# Patient Record
Sex: FEMALE | Race: WHITE
[De-identification: names, ages, dates, MRNs, and addresses within clinical notes are randomized per-mention and may not be internally consistent; named-entity substitution may affect disease eponyms.]

---

## 2017-10-25 ENCOUNTER — HOSPITAL ENCOUNTER (OUTPATIENT)
Dept: HOSPITAL 80 - FLAB | Age: 72
End: 2017-10-25
Attending: INTERNAL MEDICINE
Payer: COMMERCIAL

## 2017-10-25 DIAGNOSIS — R91.8: ICD-10-CM

## 2017-10-25 DIAGNOSIS — J44.9: Primary | ICD-10-CM

## 2017-10-25 DIAGNOSIS — I50.9: ICD-10-CM

## 2018-04-13 ENCOUNTER — HOSPITAL ENCOUNTER (INPATIENT)
Dept: HOSPITAL 80 - FED | Age: 73
LOS: 10 days | Discharge: NURSING, REHAB OR LONG-TERM CARE | DRG: 193 | End: 2018-04-23
Attending: INTERNAL MEDICINE | Admitting: INTERNAL MEDICINE
Payer: COMMERCIAL

## 2018-04-13 DIAGNOSIS — J96.21: ICD-10-CM

## 2018-04-13 DIAGNOSIS — Z99.81: ICD-10-CM

## 2018-04-13 DIAGNOSIS — E87.5: ICD-10-CM

## 2018-04-13 DIAGNOSIS — E66.01: ICD-10-CM

## 2018-04-13 DIAGNOSIS — J44.1: ICD-10-CM

## 2018-04-13 DIAGNOSIS — I11.0: ICD-10-CM

## 2018-04-13 DIAGNOSIS — E87.2: ICD-10-CM

## 2018-04-13 DIAGNOSIS — J18.9: Primary | ICD-10-CM

## 2018-04-13 DIAGNOSIS — Z87.891: ICD-10-CM

## 2018-04-13 DIAGNOSIS — N17.9: ICD-10-CM

## 2018-04-13 DIAGNOSIS — I48.91: ICD-10-CM

## 2018-04-13 DIAGNOSIS — E87.1: ICD-10-CM

## 2018-04-13 DIAGNOSIS — I50.31: ICD-10-CM

## 2018-04-13 LAB
INR PPP: 1.2 (ref 0.83–1.16)
PLATELET # BLD: 282 10^3/UL (ref 150–400)
PROTHROMBIN TIME: 15.4 SEC (ref 12–15)

## 2018-04-13 NOTE — EDPHY
HPI/HX/ROS/PE/MDM


Narrative: 


CHIEF COMPLAINT:  Shortness of breath





HISTORY OF PRESENT ILLNESS:


The patient is a 72 y/o female with a history of COPD (on 3-5L room air) 

arriving via EMS complaining of worsening shortness of breath for the past 

three weeks. She saw her PCP after developing a cold, who prescribed her 

Prednisone. At this time she did not have a fever or chest pain, but she was 

coughing up colored sputum. She felt better for 1 week, but then began to feel 

worse again. For the past week she has felt sick and had mild shortness of 

breath. While at dinner tonight she was sitting down and felt more short of 

breath than normal so her nursing home called EMS. When EMS arrived they placed 

her on 6L room air instead of her normal 5L. Takes 324mg PO Aspirin daily and 

uses her inhalers as prescribed. The inhalers have not alleviated her symptoms 

today. Due to her COPD she sleeps in a chair and occasionally has swelling in 

her legs. Denies history of atrial fibrillation, cardiac stents, or MI's. 





No fever, chills, chest pain, palpitations, vomiting, diarrhea, urinary 

complaints, headache, lightheadedness. 





REVIEW OF SYSTEMS:


Aside from elements discussed in the HPI, a comprehensive 10-point review of 

systems was reviewed and is negative.





PAST MEDICAL HISTORY: COPD (on 3-5L room air), hypertension, hyperlipidemia, 

anemia, anxiety, pneumonia, gastritis   





SOCIAL HISTORY: Lives in [place] at [facility], retired,   





VITAL SIGNS: Reviewed by me


GENERAL: Moderately obese, appears tired, well-developed, well-nourished, in 

mild to moderate respiratory distress.


HEENT: Atraumatic. Eyes: No icterus, no injection. Mouth: moist mucous 

membranes.  No erythema or lesions. Neck: supple with no adenopathy.


LUNGS: Tachypneic, diminished breath soudns throughout, no wheezes, rhonchi or 

rales.


CARDIAC: Irregularly irregular heart rate with mild tachycardia, no rubs, 

murmurs or gallops.


ABDOMEN: Soft, nontender, nondistended, bowel sounds normal.


BACK:  No CVA tenderness.


EXTREMITIES: No trauma. Bilateral pitting edema to the knees: 1+ on the left 

and trace on the right. Range of motion is normal throughout.


NEURO: Alert and oriented,  grossly nonfocal.  


SKIN: Warm and dry, no rash.


PSYCHIATRIC: Normal mentation, no agitation.





Portions of this note were transcribed by a medical scribe.  I personally 

performed a history, physical exam, medical decision making, and confirmed 

accuracy of information the transcribed note.





ED Course: 


The patient is a 72 y/o female with a history of COPD (on 3-5L room air) 

arriving via EMS presenting with worsening shortness of breath for the past 

three weeks. On exam she is tachypneic and has diminished breath sounds 

throughout. She also has an irregularly irregular heart rate with mild 

tachycardia, and bilateral pitting edema. She is currently on 6L room air. Labs

, chest x-ray, and EKG ordered. 





2132: 12-LEAD EKG:  Please see the full report in [facility].  My 

interpretation: Atrial fibrillation with a V-rate of 





2234: Chest x-ray reveals a right middle lobe and lower lobe pneumonia as well 

as a small right pleural effusion. Additional labs ordered. She will need to be 

admitted for the pneumonia and new-onset atrial fibrillation.





2236: Consulted with hospitalist service, Dr. English accepts admission of 

this patient. 





2238: Reassessed patient and discussed laboratory and imaging findings. She is 

comfortable with plan for admission. Levofloxacin given for pneumonia.





Severe Sepsis/Septic Shock Care Note





The patient presents to the ED with shortness of breath, new onset atrial 

fibrillation, and pneumonia.  The patient did have evidence of sepsis with 

heart rate greater than 90, and increased O2 requirements.   


Patient did not have evidence of end-organ dysfunction.  


MDM: 





Differential diagnosis for the patient's shortness of breath was considered 

including but not limited to pulmonary infectious processes, COPD exacerbation,

  pulmonary emboli, pulmonary edema, congestive heart failure, and cardiac 

causes.





- Data Points


Imaging Results: 


 Imaging Impressions





Chest X-Ray  04/13/18 21:56


Impression:


1.  Right middle lobe and lower lobe pneumonia.  Small right pleural effusion.


2.  Cardiomegaly.  Possible component of congestive heart failure, with mild 

pulmonary edema.











Imaging: I viewed and interpreted images myself


Laboratory Results: 


 Laboratory Results





 04/13/18 21:00 





 04/13/18 21:00 





 











  04/13/18 04/13/18 04/13/18





  21:00 21:00 21:00


 


WBC      





    


 


RBC      





    


 


Hgb      





    


 


Hct      





    


 


MCV      





    


 


MCH      





    


 


MCHC      





    


 


RDW      





    


 


Plt Count      





    


 


MPV      





    


 


Neut % (Auto)      





    


 


Lymph % (Auto)      





    


 


Mono % (Auto)      





    


 


Eos % (Auto)      





    


 


Baso % (Auto)      





    


 


Nucleat RBC Rel Count      





    


 


Absolute Neuts (auto)      





    


 


Absolute Lymphs (auto)      





    


 


Absolute Monos (auto)      





    


 


Absolute Eos (auto)      





    


 


Absolute Basos (auto)      





    


 


Absolute Nucleated RBC      





    


 


Immature Gran %      





    


 


Immature Gran #      





    


 


PT    Pending   





    


 


INR    Pending   





    


 


APTT    Pending   





    


 


Sodium      131 mEq/L L mEq/L





     (135-145) 


 


Potassium      4.7 mEq/L mEq/L





     (3.5-5.2) 


 


Chloride      94 mEq/L L mEq/L





     () 


 


Carbon Dioxide      26 mEq/l mEq/l





     (22-31) 


 


Anion Gap      11 mEq/L mEq/L





     (8-16) 


 


BUN      20 mg/dL mg/dL





     (7-23) 


 


Creatinine      1.1 mg/dL H mg/dL





     (0.6-1.0) 


 


Estimated GFR      49 





    


 


Glucose      151 mg/dL H mg/dL





     () 


 


Calcium      9.0 mg/dL mg/dL





     (8.5-10.4) 


 


Total Bilirubin  Pending     1.5 mg/dL H mg/dL





     (0.1-1.4) 


 


Conjugated Bilirubin      1.1 mg/dL H mg/dL





     (0.0-0.5) 


 


Unconjugated Bilirubin      0.4 mg/dL mg/dL





     (0.0-1.1) 


 


AST      32 IU/L IU/L





     (14-46) 


 


ALT      46 IU/L IU/L





     (9-52) 


 


Alkaline Phosphatase      79 IU/L IU/L





     () 


 


Troponin I      < 0.012 ng/mL ng/mL





     (0.000-0.034) 


 


NT-Pro-B Natriuret Pep      5660 pg/mL H pg/mL





     (0-125) 


 


Total Protein      6.9 g/dL g/dL





     (6.3-8.2) 


 


Albumin      3.6 g/dL g/dL





     (3.5-5.0) 














  04/13/18





  21:00


 


WBC  12.02 10^3/uL H 10^3/uL





   (3.80-9.50) 


 


RBC  4.00 10^6/uL L 10^6/uL





   (4.18-5.33) 


 


Hgb  11.3 g/dL L g/dL





   (12.6-16.3) 


 


Hct  36.1 % L %





   (38.0-47.0) 


 


MCV  90.3 fL fL





   (81.5-99.8) 


 


MCH  28.3 pg pg





   (27.9-34.1) 


 


MCHC  31.3 g/dL L g/dL





   (32.4-36.7) 


 


RDW  14.6 % %





   (11.5-15.2) 


 


Plt Count  282 10^3/uL 10^3/uL





   (150-400) 


 


MPV  11.8 fL H fL





   (8.7-11.7) 


 


Neut % (Auto)  81.9 % H %





   (39.3-74.2) 


 


Lymph % (Auto)  9.2 % L %





   (15.0-45.0) 


 


Mono % (Auto)  6.8 % %





   (4.5-13.0) 


 


Eos % (Auto)  0.9 % %





   (0.6-7.6) 


 


Baso % (Auto)  0.3 % %





   (0.3-1.7) 


 


Nucleat RBC Rel Count  0.0 % %





   (0.0-0.2) 


 


Absolute Neuts (auto)  9.83 10^3/uL H 10^3/uL





   (1.70-6.50) 


 


Absolute Lymphs (auto)  1.11 10^3/uL 10^3/uL





   (1.00-3.00) 


 


Absolute Monos (auto)  0.82 10^3/uL H 10^3/uL





   (0.30-0.80) 


 


Absolute Eos (auto)  0.11 10^3/uL 10^3/uL





   (0.03-0.40) 


 


Absolute Basos (auto)  0.04 10^3/uL 10^3/uL





   (0.02-0.10) 


 


Absolute Nucleated RBC  0.00 10^3/uL 10^3/uL





   (0-0.01) 


 


Immature Gran %  0.9 % %





   (0.0-1.1) 


 


Immature Gran #  0.11 10^3/uL H 10^3/uL





   (0.00-0.10) 


 


PT  





  


 


INR  





  


 


APTT  





  


 


Sodium  





  


 


Potassium  





  


 


Chloride  





  


 


Carbon Dioxide  





  


 


Anion Gap  





  


 


BUN  





  


 


Creatinine  





  


 


Estimated GFR  





  


 


Glucose  





  


 


Calcium  





  


 


Total Bilirubin  





  


 


Conjugated Bilirubin  





  


 


Unconjugated Bilirubin  





  


 


AST  





  


 


ALT  





  


 


Alkaline Phosphatase  





  


 


Troponin I  





  


 


NT-Pro-B Natriuret Pep  





  


 


Total Protein  





  


 


Albumin  





  











Medications Given: 


 





Levofloxacin/Dextrose (Levaquin 750 Mg (Premix))  150 mls @ 100 mls/hr IV EDNOW 

ONE


   PRN Reason: Protocol


   Stop: 04/14/18 00:09


   Last Admin: 04/13/18 23:16 Dose:  150 mls








General


Time Seen by Provider: 04/13/18 21:39


Initial Vital Signs: 


 Initial Vital Signs











Temperature (C)  36.7 C   04/13/18 21:16


 


Heart Rate  118 H  04/13/18 21:16


 


Respiratory Rate  20   04/13/18 21:16


 


Blood Pressure  167/132 H  04/13/18 21:16


 


O2 Sat (%)  91 L  04/13/18 21:16








 











O2 Delivery Mode               Nasal Cannula


 


O2 (L/minute)                  6














Allergies/Adverse Reactions: 


 





Penicillins Allergy (Verified 04/13/18 21:38)


 








Home Medications: 














 Medication  Instructions  Recorded


 


ACETAMINOPHEN  04/13/18


 


Advair 500/50 (*)  04/13/18


 


Albuterol  04/13/18


 


Aspirin 325 mg (*)  04/13/18


 


Atorvastatin Calcium  04/13/18


 


CLONAZEPAM  04/13/18


 


Escitalopram Oxalate  04/13/18


 


Metoprolol Tartrate  04/13/18


 


Prednisone  04/13/18


 


Proair Hfa  04/13/18


 


Ramipril  04/13/18


 


Spiriva Handihaler  04/13/18


 


Sucralfate  04/13/18


 


Tricor  04/13/18


 


amLODIPine BESYLATE  04/13/18














Departure





- Departure


Disposition: [facility] Acute


Clinical Impression: 


 Shortness of breath, Pleural effusion





Atrial fibrillation


Qualifiers:


 Atrial fibrillation type: unspecified Qualified Code(s): I48.91 - Unspecified 

atrial fibrillation





Pneumonia


Qualifiers:


 Pneumonia type: due to unspecified organism Laterality: right Lung location: 

lower lobe of lung Qualified Code(s): J18.1 - Lobar pneumonia, unspecified 

organism





Condition: Fair


Report Scribed for: Erica Regan


Report Scribed by: Sarah Campuzano


Date of Report: 04/13/18


Time of Report: 21:40

## 2018-04-13 NOTE — CPEKG
Heart Rate: 121

RR Interval: 496

QRSD Interval: 86

QT Interval: 332

QTC Interval: 471

QRS Axis: 58

T Wave Axis: -15

EKG Severity - ABNORMAL ECG -

EKG Impression: ATRIAL FIBRILLATION, V-RATE 

EKG Impression: LOW VOLTAGE IN FRONTAL LEADS

EKG Impression: BORDERLINE T ABNORMALITIES, INFERIOR LEADS

Electronically Signed By: Erica Regan 13-Apr-2018 23:06:47

## 2018-04-14 LAB — PLATELET # BLD: 213 10^3/UL (ref 150–400)

## 2018-04-14 RX ADMIN — IPRATROPIUM BROMIDE AND ALBUTEROL SULFATE SCH ML: .5; 3 SOLUTION RESPIRATORY (INHALATION) at 00:10

## 2018-04-14 RX ADMIN — IPRATROPIUM BROMIDE AND ALBUTEROL SULFATE SCH ML: .5; 3 SOLUTION RESPIRATORY (INHALATION) at 23:52

## 2018-04-14 RX ADMIN — FLUTICASONE PROPIONATE AND SALMETEROL SCH PUFFS: 50; 500 POWDER RESPIRATORY (INHALATION) at 15:21

## 2018-04-14 RX ADMIN — ONDANSETRON PRN MG: 4 TABLET, ORALLY DISINTEGRATING ORAL at 15:56

## 2018-04-14 RX ADMIN — IPRATROPIUM BROMIDE AND ALBUTEROL SULFATE SCH ML: .5; 3 SOLUTION RESPIRATORY (INHALATION) at 18:14

## 2018-04-14 RX ADMIN — IPRATROPIUM BROMIDE AND ALBUTEROL SULFATE SCH ML: .5; 3 SOLUTION RESPIRATORY (INHALATION) at 05:36

## 2018-04-14 RX ADMIN — TIOTROPIUM BROMIDE SCH PUFFS: 18 CAPSULE ORAL; RESPIRATORY (INHALATION) at 15:21

## 2018-04-14 RX ADMIN — ENOXAPARIN SODIUM SCH MG: 100 INJECTION SUBCUTANEOUS at 08:32

## 2018-04-14 RX ADMIN — METOPROLOL TARTRATE SCH MG: 50 TABLET, FILM COATED ORAL at 21:13

## 2018-04-14 RX ADMIN — METOPROLOL TARTRATE SCH MG: 50 TABLET, FILM COATED ORAL at 15:56

## 2018-04-14 RX ADMIN — IPRATROPIUM BROMIDE AND ALBUTEROL SULFATE SCH ML: .5; 3 SOLUTION RESPIRATORY (INHALATION) at 11:58

## 2018-04-14 RX ADMIN — ONDANSETRON PRN MG: 4 TABLET, ORALLY DISINTEGRATING ORAL at 00:42

## 2018-04-14 RX ADMIN — FLUTICASONE PROPIONATE AND SALMETEROL SCH PUFFS: 50; 500 POWDER RESPIRATORY (INHALATION) at 23:52

## 2018-04-14 NOTE — HOSPPROG
Hospitalist Progress Note


Assessment/Plan: 





# Acute Atrial fibrillation w/ RVR- likely triggered by PNA/COPD. [id number] of 

3  


   TElemetry (personally reviewed and interpreted) atrial fibrillation Rates 

currently 140-150 


   - start IV diltiazem drip


   - Monitor on telemetry, TTE ordered


   - Treat underlying pulmonary process


   - continue outpatient metoprolol- can up titrate


   - Consider anticoagulation





# community acquired Pneumonia - with SOB, cough w/ sputum production -CXR- RML/

RLL infiltrates WBC of 12, afebrile.


   - continue levofloxacin IV (multiple prior azithromycin courses) 


   - monitor Blood and sputum cultures 





# Severe COPD - with acute exacerbation - describe still feeling tight this 

morning


   Oxygen saturations 91% on 6 L - received Methylprednisolone x1 


   - continue prednisone 40 mg qD


   - Duonebs QID, albuterol PRN


   - monitor sputum culture 


   - will need outpatient pulmonologist





# Suspected dCHF - Pulmonary edema and elevated BNP on admission- received 

Lasix 20 mg IV x1 in emergency department


   - TTE pending





# Acute on chronic HRF - Likely multifactorial from infectin, COPD exacerbation

, AF, and mild pulmonary edema. 


   Uses 3-5 L/min O2 at baseline, currently on 6 L O2. 


   - Acute treatments as above


   - Incentive spirometry, wean O2 as able





# HTN - Continue metoprolol





# Diet - Regular


Code - Full


Ppx - LMWH


Dispo - Admit under inpatient status noting multiple active issues and need for 

further treatment and work-up.





I have discussed the case with the RN-we will start diltiazem drip this morning 

for rate control





Subjective: Very short of breath


Objective: 


 Vital Signs











Temp Pulse Resp BP Pulse Ox


 


 36.6 C   105 H  20   146/61 H  91 L


 


 04/14/18 11:07  04/14/18 12:01  04/14/18 12:01  04/14/18 11:07  04/14/18 12:01








 Laboratory Results





 04/14/18 03:58 





 04/14/18 03:58 





 











 04/13/18 04/14/18 04/15/18





 05:59 05:59 05:59


 


Intake Total  210 


 


Output Total  50 


 


Balance  160 








 











PT  15.4 SEC (12.0-15.0)  H  04/13/18  21:00    


 


INR  1.20  (0.83-1.16)  H  04/13/18  21:00    














- Physical Exam


Constitutional: no apparent distress


Eyes: anicteric sclera


Ears, Nose, Mouth, Throat: moist mucous membranes


Cardiovascular: irregularly irregular, tachycardia


Respiratory: no respiratory distress


Gastrointestinal: normoactive bowel sounds


Genitourinary: no bladder fullness


Skin: warm


Musculoskeletal: No asymmetric calves


Neurologic: AAOx3


Psychiatric: interacting appropriately


Lymph, Heme, Immunologic: no cervical LAD





ICD10 Worksheet


Patient Problems: 


 Problems











Problem Status Onset


 


Atrial fibrillation Acute  


 


Pleural effusion Acute  


 


Pneumonia Acute  


 


Shortness of breath Acute

## 2018-04-14 NOTE — ASMTCMCOM
CM Note

 

CM Note                       

Notes:

Patient admitted for PNA superimposed on severe COPD.



I met with patient, she lives at [facility]. She does not really have much 

assistance there, just some housekeeping help. She is normally independent. I explained that [facility] 

will likely require an evaulation if she is here more than 24 hours. Patient has a daughter Jasmin who 


is local and supportive. I don't think patient will have any d/c needs, but we will assist if any 

arise. 

 

Date Signed:  04/14/2018 01:33 PM

Electronically Signed By:Disha Blackburn RN

## 2018-04-14 NOTE — ECHO
[email or web address]





[address] [address] 

Main: [phone] 



Fax: 



Transthoracic Echocardiogram 

Name:            CHARLOTTE WOMACK                       MR#:

[id number]

Study Date:      04/14/2018                           Study Time:

07:18 AM

YOB: 1945                           Age:

73 year(s)

Height:          172.7 cm (68 in.)                    Weight:

113.4 kg (250 lb.)

BSA:             2.25 m2                              Gender:

Female

Examination:     Echo                                 Indication:

Question CHF

Image Quality:   Technically Difficult                Contrast: 

Requested by:    Rock Perry                 BP:

123 mmHg/59 mmHg

Heart Rate:                                           Rhythm: 

Indication:      Question CHF 



Procedure Staff 

Ultrasound Technician:   Jen Diaz [facility] 

Reading Physician:       Nilson Caldwell MD 

Requesting Provider: 



Conclusions:          The left atrium is mildly dilated.  

Mild mitral valve leaflet calcification is present.  

Tricuspid valve not well visualized.  

Mild tricuspid regurgitation is present.  

Pulmonary valve not well visualized.  

Pt is chair all night (breathing difficulty). Very limited views

available for interpretation..

Technically very limited study 



Measurements: 

Chambers                   Valvular Assessment AV/MV          Valvular

Assessment TV/PV



Normal                                 Normal

Normal

Name         Value   Range             Name        Value Range

Name          Value Range

Ao Aiax (2D): 3.6 cm  (1.4 cm-2.6 



cm)   



Continued Measurements: 

Chambers  



Name                    Value  

LADs:                 4.8 cm 



Findings: 

Left Atrium: 

The left atrium is mildly dilated.  

Mitral Valve: 

Mild mitral valve leaflet calcification is present.  

Tricuspid Valve: 



Patient: CHARLOTTE WOMACK                     MRN: [id number]

Study Date: 04/14/2018   Page 1 of 2

07:18 AM 









Tricuspid valve not well visualized. Mild tricuspid regurgitation is

present.

Pulmonic Valve: 

Pulmonary valve not well visualized.  

Pericardium: 

There is pericardial fat.  

Exam Comments: 

Pt is chair all night (breathing difficulty). Very limited views

available for interpretation..







Electronically signed by Nilson Caldwell MD on 04/14/2018 at 10:22 AM 

(No Signature Object) 



Patient: CHARLOTTE WOMACK                     MRN: [id number]

Study Date: 04/14/2018   Page 2 of 2

07:18 AM 







[id number]

## 2018-04-14 NOTE — PDMN
Medical Necessity


Medical necessity: C/M review:  est. > 2 MN LO for eval and TX of acute and 

persistent pneumonia, COPD exacerbation, new atrial fibrillation, suspected 

diastolic CHF, acute hypoxic respiratory failure, mild pulmonary edema  

requiring IV Lasix x 1, IV Methylprednisolone x 1, planned echocardiogram, 

ongoing IV Levaquin, Duonebs, cardiac monitoring, supplemental O2 6L/min., 

pulse oximetry, comorbid severe COPD likely GOLD III-D, > 3 exacerbations in 

the last year, chronic hypoxic respiratory failure on baseline 3-5l/min. O2, 

hypertension, obesity, history of patient former smoker per H/P.

## 2018-04-15 LAB — PLATELET # BLD: 214 10^3/UL (ref 150–400)

## 2018-04-15 RX ADMIN — FLUTICASONE PROPIONATE AND SALMETEROL SCH PUFFS: 50; 500 POWDER RESPIRATORY (INHALATION) at 08:48

## 2018-04-15 RX ADMIN — SUCRALFATE PRN GM: 1 TABLET ORAL at 13:57

## 2018-04-15 RX ADMIN — TIOTROPIUM BROMIDE SCH: 18 CAPSULE ORAL; RESPIRATORY (INHALATION) at 10:48

## 2018-04-15 RX ADMIN — FENOFIBRATE SCH MG: 145 TABLET ORAL at 09:29

## 2018-04-15 RX ADMIN — APIXABAN SCH MG: 5 TABLET, FILM COATED ORAL at 13:53

## 2018-04-15 RX ADMIN — IPRATROPIUM BROMIDE AND ALBUTEROL SULFATE SCH ML: .5; 3 SOLUTION RESPIRATORY (INHALATION) at 10:42

## 2018-04-15 RX ADMIN — METOPROLOL TARTRATE SCH MG: 50 TABLET, FILM COATED ORAL at 09:31

## 2018-04-15 RX ADMIN — METOPROLOL TARTRATE SCH MG: 50 TABLET, FILM COATED ORAL at 21:28

## 2018-04-15 RX ADMIN — ONDANSETRON PRN MG: 4 TABLET, ORALLY DISINTEGRATING ORAL at 10:41

## 2018-04-15 RX ADMIN — IPRATROPIUM BROMIDE AND ALBUTEROL SULFATE SCH ML: .5; 3 SOLUTION RESPIRATORY (INHALATION) at 17:07

## 2018-04-15 RX ADMIN — APIXABAN SCH MG: 5 TABLET, FILM COATED ORAL at 21:29

## 2018-04-15 RX ADMIN — ROSUVASTATIN CALCIUM SCH MG: 10 TABLET, FILM COATED ORAL at 09:30

## 2018-04-15 RX ADMIN — ONDANSETRON PRN MG: 4 TABLET, ORALLY DISINTEGRATING ORAL at 04:48

## 2018-04-15 RX ADMIN — SUCRALFATE PRN GM: 1 TABLET ORAL at 04:48

## 2018-04-15 RX ADMIN — DOCUSATE SODIUM AND SENNOSIDES SCH TAB: 50; 8.6 TABLET ORAL at 21:28

## 2018-04-15 RX ADMIN — ONDANSETRON PRN MG: 4 TABLET, ORALLY DISINTEGRATING ORAL at 15:29

## 2018-04-15 RX ADMIN — CETIRIZINE HYDROCHLORIDE SCH MG: 10 TABLET, FILM COATED ORAL at 09:30

## 2018-04-15 RX ADMIN — ENOXAPARIN SODIUM SCH MG: 100 INJECTION SUBCUTANEOUS at 09:30

## 2018-04-15 RX ADMIN — IPRATROPIUM BROMIDE AND ALBUTEROL SULFATE SCH ML: .5; 3 SOLUTION RESPIRATORY (INHALATION) at 06:04

## 2018-04-15 RX ADMIN — PROMETHAZINE HYDROCHLORIDE PRN MG: 25 INJECTION INTRAMUSCULAR; INTRAVENOUS at 19:54

## 2018-04-15 NOTE — HOSPPROG
Hospitalist Progress Note


Assessment/Plan: 





# Acute Atrial fibrillation w/ RVR- likely triggered by PNA/COPD. [id number] of 

3  


   Telemetry (personally reviewed and interpreted) atrial fibrillation Rates 

currently 140-150 


   - stopped IV diltiazem drip


   - starting eliquis


   - increase home Metoprolol to 75mb BID





# community acquired Pneumonia - with SOB, cough w/ sputum production -CXR- RML/

RLL infiltrates WBC of 12, afebrile.


   - continue levofloxacin PO


   - monitor Blood and sputum cultures 





# Severe COPD - with acute exacerbation - breathing more comfortably today


   Oxygen saturations 91% on 6 L - received Methylprednisolone x1 


   - continue prednisone 40 mg qD


   - Duonebs QID, albuterol PRN


   - monitor sputum culture 


   - will need outpatient pulmonologist





# Suspected dCHF - Pulmonary edema and elevated BNP on admission- received 

Lasix 20 mg IV x1 in emergency department


   - TTE pending





# Acute on chronic HRF - Likely multifactorial from infectin, COPD exacerbation

, AF, and mild pulmonary edema. 


   Uses 3-5 L/min O2 at baseline, currently on 6 L O2. 


   - Acute treatments as above


   - Incentive spirometry, wean O2 as able





# HTN - Continue metoprolol





# Diet - Regular


Code - Full


Ppx - LMWH


Dispo - Admit under inpatient status noting multiple active issues and need for 

further treatment and work-up.





I have discussed the case with the RN-we will start diltiazem drip this morning 

for rate control





Subjective: sob


Objective: 


 Vital Signs











Temp Pulse Resp BP Pulse Ox


 


 36.9 C   102 H  20   125/77 H  90 L


 


 04/15/18 12:00  04/15/18 12:00  04/15/18 12:00  04/15/18 12:00  04/15/18 12:00








 Laboratory Results





 04/15/18 03:46 





 04/14/18 03:58 





 











 04/14/18 04/15/18 04/16/18





 05:59 05:59 05:59


 


Intake Total 210 1250 


 


Output Total 50 200 


 


Balance 160 1050 








 











PT  15.4 SEC (12.0-15.0)  H  04/13/18  21:00    


 


INR  1.20  (0.83-1.16)  H  04/13/18  21:00    














- Physical Exam


Constitutional: chronically ill appearing, obese


Eyes: anicteric sclera


Ears, Nose, Mouth, Throat: moist mucous membranes


Cardiovascular: regular rate and rhythym


Respiratory: reduced air movement, No expiratory wheeze


Gastrointestinal: normoactive bowel sounds


Genitourinary: no bladder fullness


Skin: warm


Musculoskeletal: full muscle strength


Neurologic: AAOx3


Psychiatric: interacting appropriately


Lymph, Heme, Immunologic: no cervical LAD





ICD10 Worksheet


Patient Problems: 


 Problems











Problem Status Onset


 


Atrial fibrillation Acute  


 


Pleural effusion Acute  


 


Pneumonia Acute  


 


Shortness of breath Acute  


 


chronic disease mgmt/transitional care Acute

## 2018-04-16 RX ADMIN — TIOTROPIUM BROMIDE SCH MCG: 18 CAPSULE ORAL; RESPIRATORY (INHALATION) at 10:37

## 2018-04-16 RX ADMIN — CETIRIZINE HYDROCHLORIDE SCH MG: 10 TABLET, FILM COATED ORAL at 09:44

## 2018-04-16 RX ADMIN — IPRATROPIUM BROMIDE AND ALBUTEROL SULFATE SCH ML: .5; 3 SOLUTION RESPIRATORY (INHALATION) at 16:16

## 2018-04-16 RX ADMIN — APIXABAN SCH MG: 5 TABLET, FILM COATED ORAL at 09:44

## 2018-04-16 RX ADMIN — ROSUVASTATIN CALCIUM SCH MG: 10 TABLET, FILM COATED ORAL at 09:44

## 2018-04-16 RX ADMIN — PROMETHAZINE HYDROCHLORIDE PRN MG: 25 INJECTION INTRAMUSCULAR; INTRAVENOUS at 17:29

## 2018-04-16 RX ADMIN — DOCUSATE SODIUM AND SENNOSIDES SCH TAB: 50; 8.6 TABLET ORAL at 09:45

## 2018-04-16 RX ADMIN — APIXABAN SCH MG: 5 TABLET, FILM COATED ORAL at 21:54

## 2018-04-16 RX ADMIN — IPRATROPIUM BROMIDE AND ALBUTEROL SULFATE SCH ML: .5; 3 SOLUTION RESPIRATORY (INHALATION) at 00:43

## 2018-04-16 RX ADMIN — IPRATROPIUM BROMIDE AND ALBUTEROL SULFATE SCH ML: .5; 3 SOLUTION RESPIRATORY (INHALATION) at 20:59

## 2018-04-16 RX ADMIN — METOPROLOL TARTRATE SCH MG: 50 TABLET, FILM COATED ORAL at 09:44

## 2018-04-16 RX ADMIN — PANTOPRAZOLE SODIUM SCH MG: 40 INJECTION, POWDER, FOR SOLUTION INTRAVENOUS at 10:01

## 2018-04-16 RX ADMIN — ACETAMINOPHEN PRN MG: 325 TABLET ORAL at 03:53

## 2018-04-16 RX ADMIN — FLUTICASONE PROPIONATE AND SALMETEROL SCH PUFFS: 50; 500 POWDER RESPIRATORY (INHALATION) at 20:59

## 2018-04-16 RX ADMIN — PROMETHAZINE HYDROCHLORIDE PRN MG: 25 INJECTION INTRAMUSCULAR; INTRAVENOUS at 07:45

## 2018-04-16 RX ADMIN — IPRATROPIUM BROMIDE AND ALBUTEROL SULFATE SCH: .5; 3 SOLUTION RESPIRATORY (INHALATION) at 06:19

## 2018-04-16 RX ADMIN — FLUTICASONE PROPIONATE AND SALMETEROL SCH PUFFS: 50; 500 POWDER RESPIRATORY (INHALATION) at 00:42

## 2018-04-16 RX ADMIN — FENOFIBRATE SCH MG: 145 TABLET ORAL at 09:44

## 2018-04-16 RX ADMIN — DOCUSATE SODIUM AND SENNOSIDES SCH TAB: 50; 8.6 TABLET ORAL at 21:54

## 2018-04-16 RX ADMIN — IPRATROPIUM BROMIDE AND ALBUTEROL SULFATE SCH ML: .5; 3 SOLUTION RESPIRATORY (INHALATION) at 10:36

## 2018-04-16 RX ADMIN — FLUTICASONE PROPIONATE AND SALMETEROL SCH PUFFS: 50; 500 POWDER RESPIRATORY (INHALATION) at 10:37

## 2018-04-16 RX ADMIN — METOPROLOL TARTRATE SCH MG: 50 TABLET, FILM COATED ORAL at 21:55

## 2018-04-16 NOTE — ASMTCMCOM
CM Note

 

CM Note                       

Notes:

4/16/2018 Case Management Note



Discussed patient d/c with PT.  PT recommending SNF.  Discussed recommendations with patient.



Discussed with daughter Shari who is in agreement.  Transitional Care RN also recommending SNF 

rehab.  Discussed options with Shari and sent referrals at her request for 

[facility], [facility], [facility], [facility], [facility] and [facility] 

[facility].



Spoke with daughter Yaneli who lives in [place].  Yaneli is arriving tomorrow late morning and 

plans to arrive at [facility] after lunch.  Discussed SNF rehabs with Yaneli who is in agreement.



Case Management d/c poc:  to SNF rehab pending acceptance and authorization.



Case Management to follow.

 

Date Signed:  04/16/2018 03:40 PM

Electronically Signed By:Wendy Correa RN

## 2018-04-16 NOTE — ASMTCMCOM
CM Note

 

CM Note                       

Notes:

4/16/2018 Case Management Note



Met w/pt this morning.  Pt was admitted for PNA and AF.



Pt lives at [facility]. She takes three meals a day in the dining zavala.  She also 

has light housekeeping.  She is independent in her ADL's and does not use any med management 

services.



Pt has 2 daughters involved in her cares. Daughter Shari lives in [place] and can be reached at 

[phone].  Daughter Yaneli lives in [place] and can be reached at [phone].



Case Management d/c poc:  to be determined pending outcome of PT evals over the next few days.  Pt 

may require home care.



Case Management to follow.



 

 

Date Signed:  04/16/2018 12:44 PM

Electronically Signed By:Wendy Correa RN

## 2018-04-17 RX ADMIN — DOCUSATE SODIUM AND SENNOSIDES SCH TAB: 50; 8.6 TABLET ORAL at 21:40

## 2018-04-17 RX ADMIN — TIOTROPIUM BROMIDE SCH MCG: 18 CAPSULE ORAL; RESPIRATORY (INHALATION) at 10:26

## 2018-04-17 RX ADMIN — FLUTICASONE PROPIONATE AND SALMETEROL SCH PUFFS: 50; 500 POWDER RESPIRATORY (INHALATION) at 10:25

## 2018-04-17 RX ADMIN — IPRATROPIUM BROMIDE AND ALBUTEROL SULFATE SCH ML: .5; 3 SOLUTION RESPIRATORY (INHALATION) at 16:25

## 2018-04-17 RX ADMIN — APIXABAN SCH MG: 5 TABLET, FILM COATED ORAL at 21:40

## 2018-04-17 RX ADMIN — PANTOPRAZOLE SODIUM SCH MG: 40 INJECTION, POWDER, FOR SOLUTION INTRAVENOUS at 09:09

## 2018-04-17 RX ADMIN — POLYETHYLENE GLYCOL 3350 SCH GM: 17 POWDER, FOR SOLUTION ORAL at 09:10

## 2018-04-17 RX ADMIN — METOPROLOL TARTRATE SCH MG: 50 TABLET, FILM COATED ORAL at 09:10

## 2018-04-17 RX ADMIN — METOPROLOL TARTRATE SCH MG: 50 TABLET, FILM COATED ORAL at 21:39

## 2018-04-17 RX ADMIN — FENOFIBRATE SCH MG: 145 TABLET ORAL at 09:11

## 2018-04-17 RX ADMIN — IPRATROPIUM BROMIDE AND ALBUTEROL SULFATE SCH ML: .5; 3 SOLUTION RESPIRATORY (INHALATION) at 10:25

## 2018-04-17 RX ADMIN — APIXABAN SCH MG: 5 TABLET, FILM COATED ORAL at 09:11

## 2018-04-17 RX ADMIN — DOCUSATE SODIUM AND SENNOSIDES SCH TAB: 50; 8.6 TABLET ORAL at 09:10

## 2018-04-17 RX ADMIN — PROMETHAZINE HYDROCHLORIDE PRN MG: 25 INJECTION INTRAMUSCULAR; INTRAVENOUS at 18:07

## 2018-04-17 RX ADMIN — FLUTICASONE PROPIONATE AND SALMETEROL SCH PUFFS: 50; 500 POWDER RESPIRATORY (INHALATION) at 21:51

## 2018-04-17 RX ADMIN — IPRATROPIUM BROMIDE AND ALBUTEROL SULFATE SCH ML: .5; 3 SOLUTION RESPIRATORY (INHALATION) at 05:24

## 2018-04-17 RX ADMIN — IPRATROPIUM BROMIDE AND ALBUTEROL SULFATE SCH ML: .5; 3 SOLUTION RESPIRATORY (INHALATION) at 21:51

## 2018-04-17 RX ADMIN — ROSUVASTATIN CALCIUM SCH MG: 10 TABLET, FILM COATED ORAL at 09:10

## 2018-04-17 RX ADMIN — CETIRIZINE HYDROCHLORIDE SCH MG: 10 TABLET, FILM COATED ORAL at 09:11

## 2018-04-17 NOTE — ASMTCMCOM
CM Note

 

CM Note                       

Notes:

4/17/2018 Case Management Note



Met w/pt daughter Yaneli to discuss accepting SNF facilities.  Yaneli and sister Shari to visit tonight 

and tomorrow morning to make final decision.  Faxed updates via [facility].



 After discussion, Yaneli requested Palliative Care Consult.  Notified RN and MD of request.



Case Management d/c poc:  to SNF rehab pending family choice.



Case Management to follow.

 

Date Signed:  04/17/2018 04:52 PM

Electronically Signed By:Wendy Correa RN

## 2018-04-17 NOTE — HOSPPROG
Hospitalist Progress Note


Assessment/Plan: 





# Acute Atrial fibrillation w/ RVR- pt remains in afib - rates with improved 

control likely triggered by PNA/COPD. [id number] of 3  


   Telemetry (personally reviewed and interpreted) atrial fibrillation Rates  80

-90


   - stopped IV diltiazem drip


   - started eliquis


   - continue increased home Metoprolol to 75mg BID





# Hyponatremia - drop overnight to 125 - pt with emily poor PO inpatient suspect 

likely a component of hypovolemia - urine sodium low


   - repeat NS bolus now


   - start salt tabs


   - recheck BMP this pm





# KENNETH on CKD- also suspect related to poor PO intake - near baseline


   - NS as above


   - no nephrotoxins on list


   - recheck this pm





# hyperkalemia - 5.5 - has been fluctuating - responded to IVF yesterday


   - repeat bolus


   - kayexelate x 1


   - recheck BMP this afternoon





# community acquired Pneumonia - with SOB, cough w/ sputum production -CXR- RML/

RLL infiltrates WBC of 12-> 6, afebrile.


   - continue levofloxacin PO


   - monitor Blood and sputum cultures 





# Severe COPD - with acute exacerbation - breathing more comfortably today


   Oxygen saturations 96% on 6 L - received Methylprednisolone x1  in ED- 

respiratory pathogen panel negative


   - continue prednisone 40 mg qD


   - Duonebs QID, albuterol PRN


   - will need outpatient pulmonologist





# Suspected dCHF - elevated BNP on admission- received Lasix 20 mg IV x1 in 

emergency department


   - TTE with poor windows - no clear EF estimate





# Acute on chronic HRF - Likely multifactorial from infection, COPD exacerbation

, AF, and mild pulmonary edema. 


   Uses 3-5 L/min O2 at baseline, currently on 6 L O2. 


   - Acute treatments as above


   - Incentive spirometry, wean O2 as able





# HTN - Continue metoprolol





# Diet - Regular


Code - Full


Ppx - LMWH


Dispo - Admit under inpatient status noting multiple active issues and need for 

further treatment and work-up.





I have discussed the case with the RN-adding Kayexalate for hyperkalemia should 

assist with bowel regimen as well


Subjective: breathing feels better- no BM


Objective: 


 Vital Signs











Temp Pulse Resp BP Pulse Ox


 


 36.7 C   110 H  18   105/70   94 


 


 04/17/18 12:00  04/17/18 12:00  04/17/18 12:00  04/17/18 12:00  04/17/18 12:00








 Laboratory Results





 04/15/18 03:46 





 











 04/16/18 04/17/18 04/18/18





 05:59 05:59 05:59


 


Intake Total 200 2130 500


 


Output Total 500 1250 1150


 


Balance -300 880 -650








 











PT  15.4 SEC (12.0-15.0)  H  04/13/18  21:00    


 


INR  1.20  (0.83-1.16)  H  04/13/18  21:00    














- Physical Exam


Constitutional: chronically ill appearing, obese


Eyes: anicteric sclera


Ears, Nose, Mouth, Throat: moist mucous membranes


Cardiovascular: regular rate and rhythym


Respiratory: reduced air movement, No expiratory wheeze


Gastrointestinal: normoactive bowel sounds


Genitourinary: no bladder fullness


Skin: warm


Musculoskeletal: No asymmetric calves


Neurologic: AAOx3


Psychiatric: interacting appropriately


Lymph, Heme, Immunologic: no cervical LAD





ICD10 Worksheet


Patient Problems: 


 Problems











Problem Status Onset


 


Atrial fibrillation Acute  


 


Pleural effusion Acute  


 


Pneumonia Acute  


 


Shortness of breath Acute  


 


chronic disease mgmt/transitional care Acute

## 2018-04-18 LAB — PLATELET # BLD: 211 10^3/UL (ref 150–400)

## 2018-04-18 RX ADMIN — FUROSEMIDE SCH MG: 10 INJECTION, SOLUTION INTRAMUSCULAR; INTRAVENOUS at 15:44

## 2018-04-18 RX ADMIN — DOCUSATE SODIUM AND SENNOSIDES SCH TAB: 50; 8.6 TABLET ORAL at 09:18

## 2018-04-18 RX ADMIN — PANTOPRAZOLE SODIUM SCH MG: 40 INJECTION, POWDER, FOR SOLUTION INTRAVENOUS at 09:10

## 2018-04-18 RX ADMIN — ROSUVASTATIN CALCIUM SCH MG: 10 TABLET, FILM COATED ORAL at 09:19

## 2018-04-18 RX ADMIN — APIXABAN SCH MG: 5 TABLET, FILM COATED ORAL at 22:07

## 2018-04-18 RX ADMIN — CETIRIZINE HYDROCHLORIDE SCH MG: 10 TABLET, FILM COATED ORAL at 09:19

## 2018-04-18 RX ADMIN — ONDANSETRON PRN MG: 2 SOLUTION INTRAMUSCULAR; INTRAVENOUS at 22:16

## 2018-04-18 RX ADMIN — POLYETHYLENE GLYCOL 3350 SCH GM: 17 POWDER, FOR SOLUTION ORAL at 09:19

## 2018-04-18 RX ADMIN — PROMETHAZINE HYDROCHLORIDE PRN MG: 25 INJECTION INTRAMUSCULAR; INTRAVENOUS at 09:05

## 2018-04-18 RX ADMIN — FENOFIBRATE SCH MG: 145 TABLET ORAL at 09:19

## 2018-04-18 RX ADMIN — IPRATROPIUM BROMIDE AND ALBUTEROL SULFATE SCH ML: .5; 3 SOLUTION RESPIRATORY (INHALATION) at 12:01

## 2018-04-18 RX ADMIN — IPRATROPIUM BROMIDE AND ALBUTEROL SULFATE SCH ML: .5; 3 SOLUTION RESPIRATORY (INHALATION) at 16:25

## 2018-04-18 RX ADMIN — METOPROLOL TARTRATE SCH MG: 50 TABLET, FILM COATED ORAL at 09:19

## 2018-04-18 RX ADMIN — FLUTICASONE PROPIONATE AND SALMETEROL SCH PUFFS: 50; 500 POWDER RESPIRATORY (INHALATION) at 12:01

## 2018-04-18 RX ADMIN — METOPROLOL TARTRATE SCH MG: 50 TABLET, FILM COATED ORAL at 22:08

## 2018-04-18 RX ADMIN — DOCUSATE SODIUM AND SENNOSIDES SCH: 50; 8.6 TABLET ORAL at 22:07

## 2018-04-18 RX ADMIN — TIOTROPIUM BROMIDE SCH MCG: 18 CAPSULE ORAL; RESPIRATORY (INHALATION) at 12:01

## 2018-04-18 RX ADMIN — IPRATROPIUM BROMIDE AND ALBUTEROL SULFATE SCH ML: .5; 3 SOLUTION RESPIRATORY (INHALATION) at 20:38

## 2018-04-18 RX ADMIN — APIXABAN SCH MG: 5 TABLET, FILM COATED ORAL at 09:19

## 2018-04-18 RX ADMIN — IPRATROPIUM BROMIDE AND ALBUTEROL SULFATE SCH ML: .5; 3 SOLUTION RESPIRATORY (INHALATION) at 06:01

## 2018-04-18 RX ADMIN — FLUTICASONE PROPIONATE AND SALMETEROL SCH PUFFS: 50; 500 POWDER RESPIRATORY (INHALATION) at 20:39

## 2018-04-18 NOTE — HOSPPROG
Hospitalist Progress Note


Assessment/Plan: 





DIAGNOSES: 


# Acute on chronic hypoxemic respiratory failure, multifactorial


* Continue to be quite debilitated by her breathing and needing more oxygen 

than her baseline


# Severe COPD - with acute exacerbation 


* Currently prednisone 40 per day


* zyrtec, DuoNeb, Spiriva ongoing


# community acquired Pneumonia 


* Is at the end of the usual duration for antibiotics, but still with quite a 

bit of cough, will repeat x-ray


* No organisms identified so far


# acute on probably chronic right-sided congestive heart failure (

echocardiogram done this admission was a very poor quality, unable to really 

assess heart anatomy and function well)


* Still with quite a bit of edema; received just a single dose of Lasix on 

admission day, no direct since then (? because of renal insufficiency) - at 

this point I think diuresis is going to be essential and in fact may be very 

useful for her renal function if we can decompress her right ventricle filling 

pressures 


* She is not on a low-salt diet, and compression stockings may also help 

somewhat


* Likely has obesity hypoventilation syndrome, and would be highly suspicious 

of sleep apnea; unclear to me if she has been tested for that


# Acute Atrial fibrillation w/ RVR new diagnosis this admission / [id number] of 

3  


* Rate currently controlled on metoprolol


* On Eliquis for stroke prevention


# KENNETH on CKD-


* Unchanged in last several days, suspect this is hemodynamic and there may be 

poor renal perfusion due to right heart failure


# Hyponatremia / hyperkalemia


* Exam and urine sodium suggest this is a hypervolemic hyponatremia and should 

be treated by salt restriction and diuresis; would not use sodium supplements


# gait instability, high fall risk, severe deconditioning and generalized 

weakness


# chronic hypertension on treatment


# moderate normocytic anemia, is currently at her baseline compared to 1 year 

ago; may be related to renal disease


# morbid obesity





Patient seen by me for hospitals rounds, also during multidisciplinary rounds


I reviewed her conditions and plans in detail with the patient, the daughters 

at the bedside, and her nurse





PLANS:


-repeat chest x-ray now, will probably stop antibiotics at this point


-continue steroids bronchodilator therapy


-will add an Acapella flutter valve


-will add low-salt diet and Flaco stockings at this time


-will begin diuresis


-follow weights, volume status, renal function closely


-continue rate control and anticoagulation for AFib; if we have trouble 

controlling her volume overload/heart failure may consider whether a 

cardioversion would be helpful


-continue PT and OT


-will trying get touch with Dr. Roland and see if she has had any testing for 

sleep apnea


-plan on likely transfer to skilled nursing facility, the daughter will go look 

at facilities at this time


-ongoing palliative discussions with the palliative care team at this time;  

apparently the daughters have very different thoughts and ideas about 

appropriate decisions but the patient herself not as clearly decided yet





SUBJECTIVE:


Still very weak and tired though noted it was slightly easier for her to get up 

to the bedside commode today


Still not really able to ambulate much beyond that


Little change in dyspnea from yesterday


Still with cough, unable to bring up phlegm





OBJECTIVE


Vitals reviewed:  Occasional mild tachycardia otherwise stable without fever


Cardiac Monitor, my review:  AFib reasonably well rate controlled





Exam:


alert oriented 


Marked obesity


Unable to determine if JVD present due to obesity


skin warm dry color ok


resps not labored


lungs barely audible BSs without specific abnormal features heard


heart regular


abd soft nondistended nontender, bowel sounds present


limbs warm, still with quite a bit of edema in both legs up beyond the knees





iv site ok





Laboratory data:


Sodium improved today at 128, creatinine unchanged at 124


BUN remains elevated


Slightly more anemic today but at her baseline from year ago








Objective: 


 Vital Signs











Temp Pulse Resp BP Pulse Ox


 


 36.6 C   106 H  20   119/71   96 


 


 04/18/18 11:13  04/18/18 12:01  04/18/18 12:01  04/18/18 11:13  04/18/18 12:01








 Laboratory Results





 04/18/18 03:29 





 04/18/18 03:29 





 











 04/17/18 04/18/18 04/19/18





 06:59 06:59 06:59


 


Intake Total 2130 500 


 


Output Total 1650 2200 300


 


Balance 480 -1700 -300








 











PT  15.4 SEC (12.0-15.0)  H  04/13/18  21:00    


 


INR  1.20  (0.83-1.16)  H  04/13/18  21:00    














ICD10 Worksheet


Patient Problems: 


 Problems











Problem Status Onset


 


Atrial fibrillation Acute  


 


Pleural effusion Acute  


 


Pneumonia Acute  


 


Shortness of breath Acute  


 


chronic disease mgmt/transitional care Acute

## 2018-04-18 NOTE — ASMTCMCOM
CM Note

 

CM Note                       

Notes:

4/18/2018 Case Management Note



Met Caleb and Dilia Groves to chose facility, currently considering [facility] 

[place] and the [facility] in [place].



Pt declined palliative meeting d/t fatigue today.



Case Management d/c poc:  to SNF pending family choice with palliative consult.



Case Management to follow.

 

Date Signed:  04/18/2018 05:00 PM

Electronically Signed By:Wendy Correa RN

## 2018-04-19 LAB — PLATELET # BLD: 239 10^3/UL (ref 150–400)

## 2018-04-19 RX ADMIN — ONDANSETRON PRN MG: 2 SOLUTION INTRAMUSCULAR; INTRAVENOUS at 07:54

## 2018-04-19 RX ADMIN — POLYETHYLENE GLYCOL 3350 SCH GM: 17 POWDER, FOR SOLUTION ORAL at 08:14

## 2018-04-19 RX ADMIN — ACETAMINOPHEN PRN MG: 325 TABLET ORAL at 14:31

## 2018-04-19 RX ADMIN — ACETAMINOPHEN PRN MG: 325 TABLET ORAL at 05:04

## 2018-04-19 RX ADMIN — ACETAMINOPHEN PRN MG: 325 TABLET ORAL at 20:35

## 2018-04-19 RX ADMIN — CETIRIZINE HYDROCHLORIDE SCH MG: 10 TABLET, FILM COATED ORAL at 08:15

## 2018-04-19 RX ADMIN — DOCUSATE SODIUM AND SENNOSIDES SCH TAB: 50; 8.6 TABLET ORAL at 08:15

## 2018-04-19 RX ADMIN — METOPROLOL TARTRATE SCH MG: 50 TABLET, FILM COATED ORAL at 08:15

## 2018-04-19 RX ADMIN — FLUTICASONE PROPIONATE AND SALMETEROL SCH PUFFS: 50; 500 POWDER RESPIRATORY (INHALATION) at 20:32

## 2018-04-19 RX ADMIN — APIXABAN SCH MG: 5 TABLET, FILM COATED ORAL at 10:18

## 2018-04-19 RX ADMIN — ROSUVASTATIN CALCIUM SCH MG: 10 TABLET, FILM COATED ORAL at 08:15

## 2018-04-19 RX ADMIN — FUROSEMIDE SCH MG: 10 INJECTION, SOLUTION INTRAMUSCULAR; INTRAVENOUS at 14:33

## 2018-04-19 RX ADMIN — IPRATROPIUM BROMIDE AND ALBUTEROL SULFATE SCH ML: .5; 3 SOLUTION RESPIRATORY (INHALATION) at 04:56

## 2018-04-19 RX ADMIN — FUROSEMIDE SCH MG: 10 INJECTION, SOLUTION INTRAMUSCULAR; INTRAVENOUS at 10:18

## 2018-04-19 RX ADMIN — FLUTICASONE PROPIONATE AND SALMETEROL SCH PUFFS: 50; 500 POWDER RESPIRATORY (INHALATION) at 09:58

## 2018-04-19 RX ADMIN — IPRATROPIUM BROMIDE AND ALBUTEROL SULFATE SCH ML: .5; 3 SOLUTION RESPIRATORY (INHALATION) at 09:58

## 2018-04-19 RX ADMIN — METOPROLOL TARTRATE SCH MG: 50 TABLET, FILM COATED ORAL at 23:54

## 2018-04-19 RX ADMIN — DOCUSATE SODIUM AND SENNOSIDES SCH: 50; 8.6 TABLET ORAL at 21:07

## 2018-04-19 RX ADMIN — POLYETHYLENE GLYCOL 3350 SCH: 17 POWDER, FOR SOLUTION ORAL at 08:16

## 2018-04-19 RX ADMIN — IPRATROPIUM BROMIDE AND ALBUTEROL SULFATE SCH ML: .5; 3 SOLUTION RESPIRATORY (INHALATION) at 16:53

## 2018-04-19 RX ADMIN — APIXABAN SCH MG: 5 TABLET, FILM COATED ORAL at 21:07

## 2018-04-19 RX ADMIN — FENOFIBRATE SCH MG: 145 TABLET ORAL at 08:15

## 2018-04-19 NOTE — ASMTCMCOM
CM Note

 

CM Note                       

Notes:

Pts case discussed in morning rounds. CM spoke w/ Caio regarding family's choice to use palliative 


after d/c from SNF. CM provided pts daughter Jasmin w/ brochures for [facility]. CM spoke w/ Jasmin 

regarding pts PCP. Jasmin reports that pt has stopped seeing Dr. Jensen and have been seeing an MD at 

[facility]. CM to discuss further w/ family tomorrow regarding palliative and SNF choice. 







Plan: TBD

 

Date Signed:  04/19/2018 02:58 PM

Electronically Signed By:BRITTNEE Lay

## 2018-04-19 NOTE — ASMTCMCOM
CM Note

 

CM Note                       

Notes:

CM spoke w/ daughter and family has chosen [facility]. CM called Mady, at the [facility] and notified 

them of their choice. CM to follow up tomorrow on their selection for outpatient palliative. CM to 

follow.







Plan: [facility]

 

Date Signed:  04/19/2018 04:38 PM

Electronically Signed By:BRITTNEE Lay

## 2018-04-19 NOTE — HOSPPROG
Hospitalist Progress Note


Assessment/Plan: 


Assessment:  73-year-old female presents with acute shortness of breath 

secondary to acute COPD exacerbation, community-acquired pneumonia, acute right-

sided diastolic congestive heart failure exacerbation





Plan:





1. Acute COPD exacerbation.  Ongoing reduced expiratory air movement comma no 

expiratory wheezes or bronchial breath sounds


-status post 6 days of prednisone, discontinue


-continue duo nebs as needed


-will need to be established with outpatient pulmonologist





2. Community-acquired pneumonia.  Present on admission, chest x-ray with 

definitive right lower lobe infiltrate, personally interpreted


-status post 7 days of levofloxacin, discontinue





3. Acute diastolic right-sided congestive heart failure exacerbation.  Remains 

somewhat hypovolemic, lower extremity edema, inspiratory crackles in the bases 

with reduced air movement in the right base


-continue IV Lasix 40 mg twice daily


-continue monitor strict I&Os, daily weights


-net -3 L overnight, net -6 0.5 kg length stay





4. Acute hyponatremia.  Secondary to poor renal perfusion in the setting of 

congestive heart failure exacerbation, continue monitor closely while actively 

diuresing





5. Acute kidney injury on chronic kidney disease stage 3. The creatinine 1.5, 

most likely secondary to renal hypoperfusion in the setting of congestive heart 

failure and poor cardiac output, baseline creatinine 1.1-1.2, currently 1.3, 

continue to monitor closely while actively diuresing





6. Acute on chronic hypoxic respiratory failure.  Evidenced by SpO2 of 88% on 8 

L nasal cannula, up titrated to 11 liters/minute high-flow oxygen, with 

objective tachypnea and shortness of breath with labored breathing, secondary 

to a combination of COPD exacerbation, heart failure, pneumonia


-her baseline oxygen requirements are 3-5 L, clearly requiring more than that 

while hypoxic


-continue supplemental oxygen





7. Atrial fibrillation with acute rapid ventricular response.  New diagnosis, 

suboptimal echocardiogram, most likely provoked in the setting of conditions 

outlined above


-continue metoprolol and Eliquis


-will need outpatient cardiology follow-up





8. Hypertension.  Chronic, continue home medications





9. Morbid obesity.  Increases patient's risk of worsening morbidity and/or 

mortality with BMI of 35.7





Diet.  Cardiac





Prophylaxis.  High risk patient, on Eliquis





Code.  Do full





Disposition.  Anticipated discharge is 4/20 versus 4/21, pending stabilization 

of conditions outlined above





High-level medical complexity, high risk of worsening morbidity and/or 

mortality secondary to the issues outlined above.





Subjective: Patient reports that she is still somewhat weak, but her shortness 

of breath is improving


Objective: 


 Vital Signs











Temp Pulse Resp BP Pulse Ox


 


 36.6 C   98   14   115/85 H  98 


 


 04/19/18 16:49  04/19/18 16:57  04/19/18 16:57  04/19/18 16:49  04/19/18 16:57








 Microbiology











 04/13/18 22:45 Blood Culture - Final





 Blood 


 


 04/13/18 23:12 Blood Culture - Final





 Blood 








 Laboratory Results





 04/19/18 08:46 





 04/19/18 08:46 





 











 04/18/18 04/19/18 04/20/18





 05:59 05:59 05:59


 


Intake Total 500 1850 


 


Output Total 2600 5025 1100


 


Balance -2100 -3175 -1100








 











PT  15.4 SEC (12.0-15.0)  H  04/13/18  21:00    


 


INR  1.20  (0.83-1.16)  H  04/13/18  21:00    














- Pending Discharge


Pending Discharge Within 48 Hours: Yes


Pending Discharge Date: 04/21/18


Pending Discharge Time: 11:00





- Physical Exam


Constitutional: no apparent distress, not in pain, chronically ill appearing, 

obese, No uncomfortable


Cardiovascular: irregularly irregular, edema (1+ bilateral lower extremities), 

No systolic murmur, No tachycardia


Respiratory: reduced air movement (Right base), inspiratory crackles, No 

expiratory wheeze, No bronchial breath sounds, No aegophony


Gastrointestinal: normoactive bowel sounds, soft, non-tender abdomen, no 

palpable masses


Neurologic: AAOx3, sensation intact bilaterally, No weakness


Psychiatric: not anxious, not encephalopathic, flat affect, No agitated





ICD10 Worksheet


Patient Problems: 


 Problems











Problem Status Onset


 


Atrial fibrillation Acute  


 


Pleural effusion Acute  


 


Pneumonia Acute  


 


Shortness of breath Acute  


 


chronic disease mgmt/transitional care Acute

## 2018-04-20 RX ADMIN — ROSUVASTATIN CALCIUM SCH MG: 10 TABLET, FILM COATED ORAL at 09:08

## 2018-04-20 RX ADMIN — APIXABAN SCH MG: 5 TABLET, FILM COATED ORAL at 09:09

## 2018-04-20 RX ADMIN — FLUTICASONE PROPIONATE AND SALMETEROL SCH PUFFS: 50; 500 POWDER RESPIRATORY (INHALATION) at 20:37

## 2018-04-20 RX ADMIN — ONDANSETRON PRN MG: 2 SOLUTION INTRAMUSCULAR; INTRAVENOUS at 13:00

## 2018-04-20 RX ADMIN — POLYETHYLENE GLYCOL 3350 SCH GM: 17 POWDER, FOR SOLUTION ORAL at 09:09

## 2018-04-20 RX ADMIN — ONDANSETRON PRN MG: 2 SOLUTION INTRAMUSCULAR; INTRAVENOUS at 07:50

## 2018-04-20 RX ADMIN — APIXABAN SCH MG: 5 TABLET, FILM COATED ORAL at 20:46

## 2018-04-20 RX ADMIN — FUROSEMIDE SCH MG: 10 INJECTION, SOLUTION INTRAMUSCULAR; INTRAVENOUS at 15:36

## 2018-04-20 RX ADMIN — DOCUSATE SODIUM AND SENNOSIDES SCH: 50; 8.6 TABLET ORAL at 20:48

## 2018-04-20 RX ADMIN — ACETAMINOPHEN PRN MG: 325 TABLET ORAL at 15:37

## 2018-04-20 RX ADMIN — FENOFIBRATE SCH MG: 145 TABLET ORAL at 09:09

## 2018-04-20 RX ADMIN — ACETAMINOPHEN PRN MG: 325 TABLET ORAL at 07:50

## 2018-04-20 RX ADMIN — DOCUSATE SODIUM AND SENNOSIDES SCH TAB: 50; 8.6 TABLET ORAL at 09:08

## 2018-04-20 RX ADMIN — FLUTICASONE PROPIONATE AND SALMETEROL SCH PUFFS: 50; 500 POWDER RESPIRATORY (INHALATION) at 09:58

## 2018-04-20 RX ADMIN — TIOTROPIUM BROMIDE SCH CAP: 18 CAPSULE ORAL; RESPIRATORY (INHALATION) at 09:57

## 2018-04-20 RX ADMIN — FUROSEMIDE SCH MG: 10 INJECTION, SOLUTION INTRAMUSCULAR; INTRAVENOUS at 09:09

## 2018-04-20 RX ADMIN — METOPROLOL TARTRATE SCH MG: 50 TABLET, FILM COATED ORAL at 09:08

## 2018-04-20 RX ADMIN — ACETAMINOPHEN PRN MG: 325 TABLET ORAL at 20:46

## 2018-04-20 RX ADMIN — METOPROLOL TARTRATE SCH MG: 50 TABLET, FILM COATED ORAL at 20:46

## 2018-04-20 RX ADMIN — CETIRIZINE HYDROCHLORIDE SCH MG: 10 TABLET, FILM COATED ORAL at 09:08

## 2018-04-20 NOTE — ASMTCMCOM
CM Note

 

CM Note                       

Notes:

CM spoke w/ Haven and discussed d/c plans. Haven reports that pt is not ready for palliative at 

this time. Haven reports that she plans on speaking w/ Mady at the [facility] and plans on bringing over 


a chair for pt to use while she is there. CM spoke w/ [facility] and discussed an anticipated d/c 

date for Monday. CM sent over updates. CM to follow.







Plan: [facility]

 

Date Signed:  04/20/2018 02:12 PM

Electronically Signed By:BRITTNEE Lay

## 2018-04-21 RX ADMIN — TIOTROPIUM BROMIDE SCH MCG: 18 CAPSULE ORAL; RESPIRATORY (INHALATION) at 08:58

## 2018-04-21 RX ADMIN — DOCUSATE SODIUM AND SENNOSIDES SCH TAB: 50; 8.6 TABLET ORAL at 08:40

## 2018-04-21 RX ADMIN — POLYETHYLENE GLYCOL 3350 SCH GM: 17 POWDER, FOR SOLUTION ORAL at 08:38

## 2018-04-21 RX ADMIN — METOPROLOL TARTRATE SCH MG: 50 TABLET, FILM COATED ORAL at 08:39

## 2018-04-21 RX ADMIN — ACETAMINOPHEN PRN MG: 325 TABLET ORAL at 20:30

## 2018-04-21 RX ADMIN — TIOTROPIUM BROMIDE SCH CAP: 18 CAPSULE ORAL; RESPIRATORY (INHALATION) at 09:00

## 2018-04-21 RX ADMIN — ACETAMINOPHEN PRN MG: 325 TABLET ORAL at 16:04

## 2018-04-21 RX ADMIN — FENOFIBRATE SCH MG: 145 TABLET ORAL at 08:40

## 2018-04-21 RX ADMIN — FLUTICASONE PROPIONATE AND SALMETEROL SCH PUFFS: 50; 500 POWDER RESPIRATORY (INHALATION) at 21:18

## 2018-04-21 RX ADMIN — ONDANSETRON PRN MG: 2 SOLUTION INTRAMUSCULAR; INTRAVENOUS at 12:07

## 2018-04-21 RX ADMIN — ACETAMINOPHEN PRN MG: 325 TABLET ORAL at 06:01

## 2018-04-21 RX ADMIN — DOCUSATE SODIUM AND SENNOSIDES SCH: 50; 8.6 TABLET ORAL at 19:29

## 2018-04-21 RX ADMIN — FLUTICASONE PROPIONATE AND SALMETEROL SCH PUFFS: 50; 500 POWDER RESPIRATORY (INHALATION) at 09:00

## 2018-04-21 RX ADMIN — APIXABAN SCH MG: 5 TABLET, FILM COATED ORAL at 20:33

## 2018-04-21 RX ADMIN — ACETAMINOPHEN PRN MG: 325 TABLET ORAL at 10:59

## 2018-04-21 RX ADMIN — ROSUVASTATIN CALCIUM SCH MG: 10 TABLET, FILM COATED ORAL at 08:39

## 2018-04-21 RX ADMIN — APIXABAN SCH MG: 5 TABLET, FILM COATED ORAL at 08:41

## 2018-04-21 RX ADMIN — CETIRIZINE HYDROCHLORIDE SCH MG: 10 TABLET, FILM COATED ORAL at 08:41

## 2018-04-21 RX ADMIN — METOPROLOL TARTRATE SCH MG: 50 TABLET, FILM COATED ORAL at 20:32

## 2018-04-21 NOTE — ASMTCMCOM
CM Note

 

CM Note                       

Notes:

Discussed in rounds. Patient has discharge plan to go to [facility] in [place] when medically 

cleared for discharge. Likely Monday. CM to follow.



Plan: SNF

 

Date Signed:  04/21/2018 11:37 AM

Electronically Signed By:Nathalia Roldan RN

## 2018-04-21 NOTE — HOSPPROG
Hospitalist Progress Note


Assessment/Plan: 





#Acute COPD exacerbation: at baseline. At baseline oxygen 





#Acutely decompensated RHF/diastolic HR: hold Lasix with KENNETH, alkalosis





#Atrial fibrillation with RVR: BB 75mg BID, CHADs 3, Eliquis





#CAP: completed course abx





#Acute on chronic hypoxic resp failure: BL 3-5. Multifactorial with infection, 

COPD exacerbation. Goal O2 sat 88-90%





#HTN: resume home meds





#Metabolic alkalosis: chronic resp failure, over-diuresis





#KENNETH: Cr to 1.7 today. Holding lasix





#Deconditioning: PT/OT, plan for DC to [facility]





#Diet: 2gm sodium





#DVT: Eliquis





#Disp: cont inpatient admission, monitor Cr, telemetry








Subjective: coughing up brown sputum, feeling less SOB


Objective: 


 Vital Signs











Temp Pulse Resp BP Pulse Ox


 


 37.0 C   88   18   91/63 L  93 


 


 04/21/18 08:49  04/21/18 09:08  04/21/18 09:08  04/21/18 08:49  04/21/18 09:08








 Laboratory Results





 04/19/18 08:46 





 04/21/18 03:27 





 











 04/20/18 04/21/18 04/22/18





 05:59 05:59 05:59


 


Intake Total 400 1425 


 


Output Total 2750 1400 400


 


Balance -2350 25 -400








 











PT  15.4 SEC (12.0-15.0)  H  04/13/18  21:00    


 


INR  1.20  (0.83-1.16)  H  04/13/18  21:00    














- Time Spent With Patient


Time Spent with Patient: greater than 35 minutes


Time Spent with Patient: Greater than 35 minutes spent on this patients care, 

greater than 50% of time spent counseling, educating, and coordinating care 

regarding the above mentioned plan.





- Physical Exam


Constitutional: chronically ill appearing, obese


Eyes: PERRL


Ears, Nose, Mouth, Throat: moist mucous membranes, hearing normal


Cardiovascular: irregularly irregular, edema (+ 3 ankles edema, BL )


Respiratory: no respiratory distress, reduced air movement (decreased BS 

throughout. No wheezing)


Gastrointestinal: normoactive bowel sounds


Genitourinary: no bladder fullness


Skin: warm


Musculoskeletal: full muscle strength


Neurologic: AAOx3, CN II-XII Intact


Psychiatric: interacting appropriately





ICD10 Worksheet


Patient Problems: 


 Problems











Problem Status Onset


 


Atrial fibrillation Acute  


 


Pleural effusion Acute  


 


Pneumonia Acute  


 


Shortness of breath Acute  


 


chronic disease mgmt/transitional care Acute

## 2018-04-22 RX ADMIN — ACETAMINOPHEN PRN MG: 325 TABLET ORAL at 15:07

## 2018-04-22 RX ADMIN — DOCUSATE SODIUM AND SENNOSIDES SCH: 50; 8.6 TABLET ORAL at 21:10

## 2018-04-22 RX ADMIN — DOCUSATE SODIUM AND SENNOSIDES SCH: 50; 8.6 TABLET ORAL at 10:28

## 2018-04-22 RX ADMIN — METOPROLOL TARTRATE SCH MG: 50 TABLET, FILM COATED ORAL at 21:11

## 2018-04-22 RX ADMIN — ACETAMINOPHEN PRN MG: 325 TABLET ORAL at 08:19

## 2018-04-22 RX ADMIN — APIXABAN SCH MG: 5 TABLET, FILM COATED ORAL at 08:19

## 2018-04-22 RX ADMIN — FENOFIBRATE SCH MG: 145 TABLET ORAL at 08:19

## 2018-04-22 RX ADMIN — APIXABAN SCH MG: 5 TABLET, FILM COATED ORAL at 21:11

## 2018-04-22 RX ADMIN — METOPROLOL TARTRATE SCH MG: 50 TABLET, FILM COATED ORAL at 08:18

## 2018-04-22 RX ADMIN — ACETAMINOPHEN PRN MG: 325 TABLET ORAL at 01:13

## 2018-04-22 RX ADMIN — ACETAMINOPHEN PRN MG: 325 TABLET ORAL at 21:13

## 2018-04-22 RX ADMIN — FLUTICASONE PROPIONATE AND SALMETEROL SCH PUFFS: 50; 500 POWDER RESPIRATORY (INHALATION) at 09:35

## 2018-04-22 RX ADMIN — TIOTROPIUM BROMIDE SCH CAP: 18 CAPSULE ORAL; RESPIRATORY (INHALATION) at 09:35

## 2018-04-22 RX ADMIN — FLUTICASONE PROPIONATE AND SALMETEROL SCH PUFFS: 50; 500 POWDER RESPIRATORY (INHALATION) at 20:43

## 2018-04-22 RX ADMIN — CETIRIZINE HYDROCHLORIDE SCH MG: 10 TABLET, FILM COATED ORAL at 08:18

## 2018-04-22 RX ADMIN — ROSUVASTATIN CALCIUM SCH MG: 10 TABLET, FILM COATED ORAL at 08:19

## 2018-04-22 RX ADMIN — POLYETHYLENE GLYCOL 3350 SCH: 17 POWDER, FOR SOLUTION ORAL at 10:28

## 2018-04-22 NOTE — HOSPPROG
Hospitalist Progress Note


Assessment/Plan: 





#Acute COPD exacerbation: at baseline. At baseline oxygen 





#Metabolic alkalosis: chronic resp failure, over-diuresis. Check ABG as may be 

retaining CO2





#Acutely decompensated RHF/diastolic HR: hold Lasix with KENNETH, alkalosis. May 

resume Lasix tomorrow at reduced-dose





#Atrial fibrillation with RVR: resolved.  Metoprolol 75mg BID, CHADs 3, Eliquis





#CAP: completed course abx





#Acute on chronic hypoxic resp failure: BL 3-5. Multifactorial with infection, 

COPD exacerbation. Goal O2 sat 88-90%





#HTN: resume home meds








#KENNETH: Cr to 1.5 today. Holding lasix





#Deconditioning: PT/OT, plan for DC to [facility] rehab





#Diet: 2gm sodium





#DVT: Eliquis





#Disp: cont inpatient admission, monitor Cr, telemetry. May DC in next 1-2 days 

once labs stable








Subjective: fatigued. Worked with PT this morning


Objective: 


 Vital Signs











Temp Pulse Resp BP Pulse Ox


 


 36.8 C   95   18   115/64   94 


 


 04/22/18 07:10  04/22/18 09:38  04/22/18 09:38  04/22/18 07:10  04/22/18 09:38








 Laboratory Results





 04/19/18 08:46 





 04/22/18 03:39 





 











 04/21/18 04/22/18 04/23/18





 05:59 05:59 05:59


 


Intake Total 1425 1430 


 


Output Total 1400 850 500


 


Balance 25 580 -500








 











PT  15.4 SEC (12.0-15.0)  H  04/13/18  21:00    


 


INR  1.20  (0.83-1.16)  H  04/13/18  21:00    














- Time Spent With Patient


Time Spent with Patient: greater than 35 minutes


Time Spent with Patient: Greater than 35 minutes spent on this patients care, 

greater than 50% of time spent counseling, educating, and coordinating care 

regarding the above mentioned plan.





- Physical Exam


Constitutional: obese


Eyes: PERRL


Ears, Nose, Mouth, Throat: moist mucous membranes


Cardiovascular: irregularly irregular, edema (+ 2 pitting edema over shins)


Respiratory: reduced air movement


Skin: warm


Neurologic: AAOx3, CN II-XII Intact





ICD10 Worksheet


Patient Problems: 


 Problems











Problem Status Onset


 


chronic disease mgmt/transitional care Acute  


 


Atrial fibrillation Acute  


 


Shortness of breath Acute  


 


Pneumonia Acute  


 


Pleural effusion Acute

## 2018-04-23 VITALS — DIASTOLIC BLOOD PRESSURE: 59 MMHG | SYSTOLIC BLOOD PRESSURE: 120 MMHG

## 2018-04-23 RX ADMIN — ROSUVASTATIN CALCIUM SCH MG: 10 TABLET, FILM COATED ORAL at 08:57

## 2018-04-23 RX ADMIN — ACETAMINOPHEN PRN MG: 325 TABLET ORAL at 05:06

## 2018-04-23 RX ADMIN — DOCUSATE SODIUM AND SENNOSIDES SCH: 50; 8.6 TABLET ORAL at 09:01

## 2018-04-23 RX ADMIN — TIOTROPIUM BROMIDE SCH CAP: 18 CAPSULE ORAL; RESPIRATORY (INHALATION) at 09:08

## 2018-04-23 RX ADMIN — FLUTICASONE PROPIONATE AND SALMETEROL SCH PUFFS: 50; 500 POWDER RESPIRATORY (INHALATION) at 09:07

## 2018-04-23 RX ADMIN — METOPROLOL TARTRATE SCH MG: 50 TABLET, FILM COATED ORAL at 08:56

## 2018-04-23 RX ADMIN — FENOFIBRATE SCH MG: 145 TABLET ORAL at 08:57

## 2018-04-23 RX ADMIN — ACETAMINOPHEN PRN MG: 325 TABLET ORAL at 10:35

## 2018-04-23 RX ADMIN — APIXABAN SCH MG: 5 TABLET, FILM COATED ORAL at 08:56

## 2018-04-23 RX ADMIN — POLYETHYLENE GLYCOL 3350 SCH: 17 POWDER, FOR SOLUTION ORAL at 09:01

## 2018-04-23 RX ADMIN — CETIRIZINE HYDROCHLORIDE SCH MG: 10 TABLET, FILM COATED ORAL at 08:57

## 2018-04-23 NOTE — ASDISCHSUM
----------------------------------------------

Discharge Information

----------------------------------------------

Plan Status:SNF                                      Medically Cleared to Leave:04/23/2018

Discharge Date:04/23/2018                            CM D/C Disposition:Skilled Nursing Facility

ADT D/C Disposition:Skilled Nursing Facility         Projected Discharge Date:04/18/2018 11:00 AM

Transportation at D/C:Wheelchair Van                 Discharge Delay Reason:

Follow-Up Date:04/18/2018 11:00 AM                   Discharge Slot:

Final Diagnosis:

----------------------------------------------

Placement Information

----------------------------------------------

Referral Type:Assisted Living Residence              Referral ID:[id number]

Provider Name:

Address 1:                                           Phone Number:

Address 2:                                           Fax Number:

City:                                                Critical access hospital Factors:

State:

 

Referral Type:*Nursing Home/SNF                      Referral ID:[id number]

Provider Name:[facility]

Address 1:[address]                            Phone [phone]

Address 2:                                           Fax [phone]

City:[place]                                        Selection Factors:

State:[place]

 

Referral Type:Palliative Care                        Referral ID:[id number]

Provider Name:[facility]

Address 1:[address]                            Phone Number:

Address 2:                                           Fax Number:

[facility]:[place]                                            Selection Factors:

State:[place]

 

----------------------------------------------

Patient Contact Information

----------------------------------------------

Contact Name:CARLITOS                                Relationship:Daughter

Address:                                             Home Phone:

                                                     Work [phone]

City:                                                Alternate Phone:

State/Zip Code:                                      Email:

----------------------------------------------

Financial Information

----------------------------------------------

Financial Class:Medicare

Primary Plan Desc:MEDICARE INPATIENT                 Primary Plan Number:[id number]

Secondary Plan Desc:ROSMERY/LAMAR SUPPLEMENT              Secondary Plan Number:[id number]

 

 

----------------------------------------------

Assessment Information

----------------------------------------------

----------------------------------------------

[facility] CM Progress Note

----------------------------------------------

CM Note

 

CM Note                       

Notes:

Patient admitted for PNA superimposed on severe COPD.



I met with patient, she lives at [facility]. She does not really have much 

assistance there, just some housekeeping help. She is normally independent. I explained that [facility] 

will likely require an evaulation if she is here more than 24 hours. Patient has a daughter Jasmin who 


is local and supportive. I don't think patient will have any d/c needs, but we will assist if any 

arise. 

 

Date Signed:  04/14/2018 01:33 PM

Electronically Signed By:Disha Blackburn RN

 

 

----------------------------------------------

LACE

----------------------------------------------

WYATT

 

Length of stay for            Answers:  7-13 days                             

current admission                                                             

Acuity / Level of             Answers:  Yes                                   

Care: Did the patient                                                         

have an inpatient                                                             

admission?                                                                    

Comorbidities - select        Answers:  Chronic pulmonary disease             

all that apply                                                                

                                        Other                         Notes:  HTN

# of Emergency department     Answers:  1-2                                   

visits in the last 6                                                          

months                                                                        

Social determinants           Answers:  Mental health diagnosis               

                                        (anxiety, depression, pers            

                                        onality disorders, etc.)              

Score: 15

 

Date Signed:  04/23/2018 02:03 PM

Electronically Signed By:Wendy Correa RN

 

 

----------------------------------------------

[facility] CM Progress Note

----------------------------------------------

CM Note

 

CM Note                       

Notes:

4/16/2018 Case Management Note



Met w/pt this morning.  Pt was admitted for PNA and AF.



Pt lives at [facility]. She takes three meals a day in the dining zavala.  She also 

has light housekeeping.  She is independent in her ADL's and does not use any med management 

services.



Pt has 2 daughters involved in her cares. Daughter Shari lives in [place] and can be reached at 

[phone].  Daughter Yaneli lives in [place] and can be reached at [phone].



Case Management d/c poc:  to be determined pending outcome of PT evals over the next few days.  Pt 

may require home care.



Case Management to follow.



 

 

Date Signed:  04/16/2018 12:44 PM

Electronically Signed By:Wendy Correa RN

 

 

----------------------------------------------

[facility] CM Progress Note

----------------------------------------------

CM Note

 

CM Note                       

Notes:

4/16/2018 Case Management Note



Discussed patient d/c with PT.  PT recommending SNF.  Discussed recommendations with patient.



Discussed with daughter Shari who is in agreement.  Transitional Care RN also recommending SNF 

rehab.  Discussed options with Shari and sent referrals at her request for 

[facility], [facility], [facility], [facility], [facility] and [facility].



Spoke with daughter Yaneli who lives in [place].  Yaneli is arriving tomorrow late morning and 

pl[facility] to [facility].  Discussed SNF rehabs with Yaneli who is in agreement.



Case Management d/c poc:  to SNF rehab pending acceptance and authorization.



Case Management to follow.

 

Date Signed:  04/16/2018 03:40 PM

Electronically Signed By:Wendy Correa RN

 

 

----------------------------------------------

[facility] CM Progress Note

----------------------------------------------

CM Note

 

CM Note                       

Notes:

4/17/2018 Case Management Note



Met w/pt daughter Yaneli to discuss accepting SNF facilities.  Yaneli and sister Shari to visit [facility] 

and tomorrow morning to make final decision.  Faxed updates via [employer].



 After discussion, Yaneli requested Palliative Care Consult.  Notified RN and MD of request.



Case Management d/c poc:  to SNF rehab pending family choice.



Case Management to follow.

 

Date Signed:  04/17/2018 04:52 PM

Electronically Signed By:Wendy Correa RN

 

 

----------------------------------------------

[facility] Progress Note

----------------------------------------------

CM Note

 

CM Note                       

Notes:

4/18/2018 Case Management Note



Met w/Yaneli and Dilia Groves to [facility], currently considering [facility] and [facility] and [facility] in [place].



Pt declined palliative meeting d/t fatigue today.



Case Management d/c poc:  to SNF pending family choice with palliative consult.



Case Management to follow.

 

Date Signed:  04/18/2018 05:00 PM

Electronically Signed By:Wendy Correa RN

 

 

----------------------------------------------

[facility] CM Progress Note

----------------------------------------------

CM Note

 

CM Note                       

Notes:

Pts case discussed in morning rounds. JAKE spoke w/ Caio regarding family's choice to use palliative 


after d/c from SNF. JAKE provided pts daughter Jasmin w/ brochures for [facility] and [facility]. JAKE spoke w/ Jasmin 

regarding pts PCP. Jasmin reports that pt has stopped seeing Dr. Jensen and have been seeing an MD at 

[facility]. JAKE to discuss further w/ family tomorrow regarding palliative and SNF choice. 







Plan: TBD

 

Date Signed:  04/19/2018 02:58 PM

Electronically Signed By:BRITTNEE Lay

 

 

----------------------------------------------

[facility] JAKE Progress Note

----------------------------------------------

CM Note

 

CM Note                       

Notes:

JAKE spoke w/ daughter and family has chosen [facility]. CM called Mady, at the [facility] and notified 

them of their choice. CM to follow up tomorrow on their selection for outpatient palliative. CM to 

follow.







Plan: [facility]

 

Date Signed:  04/19/2018 04:38 PM

Electronically Signed By:BRITTNEE Lay

 

 

----------------------------------------------

[facility] CM Progress Note

----------------------------------------------

CM Note

 

CM Note                       

Notes:

CM spoke w/ Haven and discussed d/c plans. Haven reports that pt is not ready for palliative at 

this time. Haven reports that she plans on speaking w/ Mady at the [facility] and plans on bringing over 


a chair for pt to use while she is there. CM spoke w/ [facility] and discussed an anticipated d/c 

date for Monday. CM sent over updates. CM to follow.







Plan: [facility]

 

Date Signed:  04/20/2018 02:12 PM

Electronically Signed By:BRITTNEE Lay

 

 

----------------------------------------------

[facility] JAKE Progress Note

----------------------------------------------

JAKE Note

 

JAKE Note                       

Notes:

Discussed in rounds. Patient has discharge plan to go to [facility] in [place] when medically 

cleared for discharge. Likely Monday. CM to follow.



Plan: [facility]

 

Date Signed:  04/21/2018 11:37 AM

Electronically Signed By:Nathalia Roldan RN

 

 

----------------------------------------------

[facility] CM Progress Note

----------------------------------------------

CM Note

 

CM Note                       

Notes:

4/23/2018 Case Management Note



Discussed case with Caio from palliative care.  Please see palliative care note.  Faxed referral 

to [facility] for [facility] follow up.



Case Management d/c poc:  to [facility].

 

Date Signed:  04/23/2018 10:53 AM

Electronically Signed By:Wendy Correa RN

 

 

----------------------------------------------

Case Management Discharge Plan Note

----------------------------------------------

Case Management Discharge

 

Discharge Order Complete?     Answers:  Yes                                   

Patient to Obtain             Answers:  Other                         Notes:  [facility]

Medications                                                                   

Transportation Arranged       Answers:  Other                         Notes:  arranged by [facility]

Transport will Pick (Date     04/23/2018 04:30 PM

& Time)                       

Faxed Final Orders            Answers:  Yes                                   

Agency/Facility Transfer      Answers:  Yes                           Notes:  faxed to [facility]

Report Printed & Faxed to                                                     

Receiving Agency                                                              

Family Notified               Answers:  Yes                           Notes:  by phone

Discharge Comments            

Notes:

[id number] Case Management Note



Pt to d/c to [facility] with [facility] to follow.  Notified Yaneli at her request of d/c 

time.  [facility] arranged transport.  W/C with [facility].  RN called report.  faxed final orders.

 

Date Signed:  04/23/2018 02:02 PM

Electronically Signed By:Wendy Correa RN

 

 

----------------------------------------------

Intervention Information

----------------------------------------------

## 2018-04-23 NOTE — ASMTCMCOM
CM Note

 

CM Note                       

Notes:

4/23/2018 Case Management Note



Discussed case with Caio from palliative care.  Please see palliative care note.  Faxed referral 

to [facility] for outpatient follow up.



Case Management d/c poc:  to [facility] with [facility].

 

Date Signed:  04/23/2018 10:53 AM

Electronically Signed By:Wendy Correa RN

## 2018-04-23 NOTE — GDS
[f 
rep st]



                                                             DISCHARGE SUMMARY





DISCHARGE DIAGNOSES:  

1.  Acute-on-chronic hypoxemic respiratory failure.

2.  Acute chronic obstructive pulmonary disease exacerbation.

3.  Acutely decompensated right heart failure/diastolic heart failure.

4.  Metabolic alkalosis.

5.  Atrial fibrillation with rapid ventricular response.

6.  Community-acquired pneumonia.

7.  Hypertension.

8.  Acute kidney injury.

9.  Deconditioning.



HISTORY OF PRESENT ILLNESS:  A 73-year-old female with obesity, COPD, chronic 
hypoxemic respiratory failure, and hypertension, presenting with cough and 
shortness of breath.  She noted 5 days prior to admission that she developed a 
new cough and some shortness of breath.  It was productive with yellow/green 
sputum.  She sees Dr. Roland for COPD and uses 3-5 L chronically.  She has had 
at least 3 COPD exacerbations in the past year.



HOSPITAL COURSE BY PROBLEM:  

1.  Acute-on-chronic hypoxemic respiratory failure:  Multifactorial with 
community-acquired pneumonia and COPD exacerbation.  Now stable on her home 
oxygen, 3-5L. 

2.  CAP:  Completed antibiotic course.

3.  Acutely decompensated right heart failure/diastolic heart failure:  Suspect 
diastolic heart failure with longstanding AFib.  She has mild TR on 
echocardiogram. Was diuresed with subsequent acute kidney injury and alkalosis, 
so we held for few days. Resume just 20 mg of p.o. Lasix with repeat BMP in 2 
days. Needs eval for PAT.

4.  Atrial fibrillation:  Went into RVR here.  Increased her metoprolol to 75 
mg from 50 b.i.d.  CHADS score 3.  We will continue Eliquis.

5.  Hypertension:  Resume home medications.

6.  KENNETH:  Creatinine baseline is 1.1 to 1.3.  This was elevated to 1.5 with 
diuresis. Cr now at baseline. Monitor closely with diuresis.

7.  Metabolic alkalosis: has chronic respiratory failure, compounded by 
diuresis. Checked ABG, which showed some retention of CO2, but denies NIPPV. 
Needs outpatient  sleep study to eval for PAT.

8.  Deconditioning:  PT/OT.  She is going to [facility] rehab.



DISPOSITION:  Patient is stable for discharge to [facility] for continuous rehab.



MEDICATIONS:  Change in medications:  Metoprolol 75 mg b.i.d.



FOLLOWUP:  

1.  Dr. Roland.  

2.  Outpatient sleep study for PAT, CPAP.

3.  Repeat BMP on 04/25/2018, to monitor alkalosis and kidney disease.



PHYSICAL EXAMINATION:  VITAL SIGNS:  Today, temperature 37, blood pressure 120/
53, heart rate is 86, respirations 20, 99% on 3 L.  GENERAL:  Obese female, no 
acute distress, talkative.  HEENT:  PERRLA.  Moist mucous membranes.  CV:  
Irregularly irregular.  +1 ankle edema bilaterally.  ABDOMEN:  Obese, soft, 
nontender, nondistended.  Positive bowel sounds.  :  No Dale.  
MUSCULOSKELETAL:  Moving all 4 extremities.  NEURO:  2 through 12 intact.  PSYCH
:  Alert and oriented x3.





Job #:  [id number]

MTDD

## 2018-04-23 NOTE — PDIAF
- Diagnosis


Diagnosis: COPD exacerbation


Code Status: Do Not Resuscitate





- Medication Management


Discharge Medications: 


 Medications to Continue on Transfer





Albuterol Hfa Anes Only [Proair Hfa Icu (*)] 1 - 2 puffs IH QID PRN 04/14/18 [

Last Taken Unknown]


Albuterol [Proventil Neb] 3 ml IH QID PRN 04/14/18 [Last Taken Unknown]


Aspirin [Aspirin 325 mg (*)] 325 mg PO DAILY 04/14/18 [Last Taken 04/13/18]


Cetirizine [ZyrTEC 10 mg (*)] 10 mg PO DAILY 04/14/18 [Last Taken 04/13/18]


Escitalopram Oxalate [Lexapro] 10 mg PO DAILY 04/14/18 [Last Taken 04/13/18]


Famotidine [Pepcid 20 MG (*)] 20 mg PO BID PRN 04/14/18 [Last Taken Unknown]


Fenofibrate [Tricor 145 mg (*)] 145 mg PO DAILY 04/14/18 [Last Taken 04/13/18]


Fluticasone/Salmeter 500/50Mcg [Advair 500/50 (*)] 1 puffs IH BID 04/14/18 [

Last Taken 04/13/18]


Metoprolol Tartrate [Lopressor 50 mg (*)] 50 mg PO BID 04/14/18 [Last Taken 04/ 13/18]


Ramipril [Altace] 10 mg PO DAILY 04/14/18 [Last Taken 04/13/18]


Rosuvastatin Calcium [Crestor 10mg (RX)] 10 mg PO DAILY 04/14/18 [Last Taken 04/ 13/18]


Sucralfate [Carafate 1 GM (*)] 1 gm PO TID PRN 04/14/18 [Last Taken Unknown]


Tiotropium Inhaler [Spiriva Inhaler] 1 inh IH DAILY@12 04/14/18 [Last Taken 04/ 13/18]


amLODIPine BESYLATE [Norvasc 5 mg (*)] 5 mg PO DAILY 04/14/18 [Last Taken 04/13/ 18]


clonazePAM [Klonopin (*)] 0.25 - 0.5 mg PO HS PRN 04/14/18 [Last Taken Unknown]








Discharge Medications: Refer to the Discharge Home Medication list for PRN 

reason.





- Orders


Services needed: Registered Nurse, Certified Nursing Aide, Physical Therapy, 

Occupational Therapy


Isolation Type: None


Diet Recommendation: cardiac -low fat low salt


Additional Instructions: 


Recommend sleep study for PAT.





- Labs/Radiology


BMP Date: 04/25/18 (BL Cr 1.3-1.5. Monitor with Lasix)





- Follow Up Care


Current Providers and Referrals: 


Luis Roland MD [Medical Doctor] - 04/24/18 10:30 am


()


Patient,NotPresent [Unknown] - As per Instructions

## 2018-04-23 NOTE — ASMTLACE
LACE

 

Length of stay for            Answers:  7-13 days                             

current admission                                                             

Acuity / Level of             Answers:  Yes                                   

Care: Did the patient                                                         

have an inpatient                                                             

admission?                                                                    

Comorbidities - select        Answers:  Chronic pulmonary disease             

all that apply                                                                

                                        Other                         Notes:  HTN

# of Emergency department     Answers:  1-2                                   

visits in the last 6                                                          

months                                                                        

Social determinants           Answers:  Mental health diagnosis               

                                        (anxiety, depression, pers            

                                        onality disorders, etc.)              

Score: 15

 

Date Signed:  04/23/2018 02:03 PM

Electronically Signed By:Wendy Correa RN

## 2018-07-31 ENCOUNTER — HOSPITAL ENCOUNTER (INPATIENT)
Dept: HOSPITAL 80 - FED | Age: 73
LOS: 6 days | Discharge: NURSING, REHAB OR LONG-TERM CARE | DRG: 166 | End: 2018-08-06
Attending: FAMILY MEDICINE | Admitting: FAMILY MEDICINE
Payer: COMMERCIAL

## 2018-07-31 DIAGNOSIS — Z99.81: ICD-10-CM

## 2018-07-31 DIAGNOSIS — I10: ICD-10-CM

## 2018-07-31 DIAGNOSIS — I48.91: ICD-10-CM

## 2018-07-31 DIAGNOSIS — J44.9: ICD-10-CM

## 2018-07-31 DIAGNOSIS — C34.11: Primary | ICD-10-CM

## 2018-07-31 DIAGNOSIS — J18.9: ICD-10-CM

## 2018-07-31 DIAGNOSIS — Z79.01: ICD-10-CM

## 2018-07-31 LAB
INR PPP: 1.53 (ref 0.83–1.16)
PLATELET # BLD: 199 10^3/UL (ref 150–400)
PROTHROMBIN TIME: 18.5 SEC (ref 12–15)

## 2018-07-31 NOTE — EDPHY
H & P


Stated Complaint: BIBA for ?nosebleed vs spitting up blood


Source: Patient


Exam Limitations: No limitations





- Personal History


Current Tetanus/Diphtheria Vaccine: Unsure


Current Tetanus Diphtheria and Acellular Pertussis (TDAP): Unsure





- Medical/Surgical History


Hx Asthma: No


Hx Chronic Respiratory Disease: Yes


Hx Diabetes: No


Hx Cardiac Disease: No


Hx Renal Disease: No


Hx Cirrhosis: No


Hx Alcoholism: No


Hx HIV/AIDS: No


Hx Splenectomy or Spleen Trauma: No


Other PMH: anemia, anxiety, obesity, bronchitis, COPD, HTN, hypoxia, limited 

mobility, hyperlipidemia





- Social History


Smoking Status: Former smoker


Time Seen by Provider: 07/31/18 15:29


HPI/ROS: 


HPI:  This is a 73-year-old female who presents with





Chief Complaint: BIBA for ?nosebleed vs spitting up blood





Location:  Mouth


Quality:  Spitting up blood


Duration:  1 tooth 3 hr prior to arrival


Signs and Symptoms:  no shortness of breath at rest, no shortness of breath on 

exertion, no cough, no chest pain, no palpitations, no lower extremity edema, 

no wheezing, no orthopnea, no paroxysmal nocturnal dyspnea, no fever, no injury/

trauma, no hemoptysis, no carpal pedal spasms


Timing:  Acute


Severity:  Mild


Context:  Patient has a history of atrial fibrillation and chronic oxygen use, 

on Eliquis and aspirin, presents via ambulance from skilled nursing facility 

for further investigation a nose bleed versus spitting up blood that started 

approximately 1-3 hours prior to arrival.  Patient reports that she is supposed 

to use saline nasal drops to prevent nasal mucosa dryness due to chronic oxygen 

use but has not been using these.  She reports that she coughed earlier and 

felt like she had phlegm in the back of her throat.  When she spit out she 

noted that there was blood tinged mucus.  She is unsure if she is coughing 

upper fit is coming from her nose.  She denies any chest pain, dizziness, 

shortness of breath.  She reports that her last laboratory studies were 

performed approximately 2 weeks ago.  She denies any easy bruising, bleeding 

gums.  Patient is very talkative and reports that her skilled nursing facility 

sent her to the emergency room even though she"feels fine."


Modifying Factors:  None





Comment: 








ROS: see HPI


Constitutional: No fever, no chills, no weight loss


Eyes:  No blurred vision


Respiratory:  No shortness of breath, no cough


Cardiovascular:  No chest pain, no palpitations, no lower extremity edema 


Gastrointestinal:  No nausea, no vomiting, no diarrhea 


Genitourinary:  No dysuria 


Extremities:  No myalgias 


Neurologic:  No weakness, no numbness


Skin:  No rashes


Hematologic:  No bruising, + bleeding





MEDICAL/SURGICAL/SOCIAL HISTORY: 


Medical/surgical history:  anemia, anxiety, obesity, bronchitis, COPD, HTN, 

hypoxia on supplemental oxygen, limited mobility, hyperlipidemia


Social history:  Lives at skilled nursing facility. 








CONSTITUTIONAL:  Extremely polite and cooperative chronically ill-appearing 

elderly white female, awake and alert, no obvious distress


HEENT: Atraumatic and normocephalic, PERRL, EOMI.  Nares patent; dried anterior 

epistaxis on right near noted, no active bleeding;  no nasal mucosal edema.  

Nasal cannula present in both nares. Tympanic membranes clear. Oropharynx clear

, posterior pharynx shows no blood or edema; no exudate and moist pink mucosa.  

Airway patent.  No lymphadenopathy.  No meningismus.


Cardiovascular: Normal S1/S2, regular rate, regular rhythm, without murmur rub 

or gallop.


PULMONARY/CHEST:  Symmetrical and nontender. Clear to auscultation bilaterally. 

Good air movement. No accessory muscle usage.


ABDOMEN:  Soft, nondistended, nontender, no rebound, no guarding, no peritoneal 

signs, no masses or organomegaly. No CVAT.


EXTREMITIES:  2/2 pulses, strength 5/5, no deformities, no clubbing, no 

cyanosis or edema.


NEUROLOGICAL: no focal neuro deficits.  GCS 15.


SKIN: Warm and dry, no erythema. no rash.  Good capillary refill. 


  


 (WalcottEdyta medina)


Constitutional: 





 Initial Vital Signs











Temperature (C)  36.8 C   07/31/18 14:46


 


Heart Rate  94   07/31/18 14:46


 


Respiratory Rate  16   07/31/18 14:46


 


Blood Pressure  104/76   07/31/18 14:46


 


O2 Sat (%)  95   07/31/18 14:46








 











O2 Delivery Mode               Nasal Cannula


 


O2 (L/minute)                  2














Allergies/Adverse Reactions: 


 





Penicillins Allergy (Verified 07/31/18 14:45)


 Anaphylaxis








Home Medications: 














 Medication  Instructions  Recorded


 


Albuterol Hfa Anes Only [Proair 1 - 2 puffs IH QID PRN 04/14/18





Hfa Icu (*)]  


 


Cetirizine [ZyrTEC 10 mg (*)] 10 mg PO DAILY PRN 04/14/18


 


Escitalopram Oxalate [Lexapro 10 10 mg PO DAILY 04/14/18





MG]  


 


Famotidine [Pepcid 20 MG (*)] 20 mg PO BID PRN 04/14/18


 


Fenofibrate [Tricor 145 mg (*)] 145 mg PO DAILY 04/14/18


 


Fluticasone/Salmeter 500/50Mcg 1 puffs IH BID 04/14/18





[Advair 500/50 (*)]  


 


Sucralfate [Carafate 1 GM (*)] 1 gm PO QID PRN 04/14/18


 


Tiotropium Inhaler [Spiriva 1 inh IH DAILY@12 04/14/18





Inhaler (RX)]  


 


amLODIPine BESYLATE [Norvasc 5 mg 5 mg PO DAILY 04/14/18





(*)]  


 


clonazePAM [Klonopin (*)] 0.25 - 0.5 mg PO HS PRN 04/14/18


 


Acetaminophen [Tylenol  mg 1,000 mg PO Q6 PRN 07/31/18





(*)]  


 


Albuterol Sulfate [Ventolin Hfa] 2 puffs IH Q6H PRN 07/31/18


 


Apixaban [Eliquis] 5 mg PO BID 07/31/18


 


Atorvastatin Calcium [Lipitor 20 20 mg PO HS 07/31/18





mg (*)]  


 


Benzonatate 200 mg PO TID 07/31/18


 


Furosemide [Lasix 20 MG (*)] 20 mg PO DAILY 07/31/18


 


Ipratropium/Albuterol [Duoneb (*)] 3 ml IH QID PRN 07/31/18


 


Ipratropium/Albuterol [Duoneb (*)] 3 ml IH QID PRN 07/31/18


 


Metoprolol Tartrate [Lopressor 50 50 mg PO BID 07/31/18





mg (*)]  


 


guaiFENesin [Mucinex 600 MG (*)] 600 mg PO BID 07/31/18














Medical Decision Making


ED Course/Re-evaluation: 


Vital signs reviewed and stable upon arrival. 


Laboratory studies and chest x-ray ordered. 


Dried anterior area in right near noted; no signs of active bleeding. 


1610:  Called by Radiology, Dr. Fagan, who reports chest x-ray shows right 

consolidation that has been present for over year and slowly increasing in 

size.  He is requesting CT chest with contrast for further evaluation of 

pneumonia.  Patient is on Eliquis and low yield for pulmonary embolism. 


1654:  Labs reviewed.  H&H 11.0/33.5.  INR 1.53. No leukocytosis/

thrombocytopenia. 


1700:  End of shift.  Signed over to Dr. Regan pending creatinine level and 

CT chest to further evaluate right opacification.  Final disposition to be 

determined. 








This patient was seen under the supervision of my secondary supervising 

physician.  I evaluated care for this patient independently.  Discussed this 

patient with Dr. Regan.  


 (Edyta Gallegos)


Differential Diagnosis: 


Differential diagnosis includes but is not limited to hemoptysis, pulmonary 

embolism, atrial fibrillation, chronic anticoagulation, epistaxis, coagulopathy.


 (Edyta Gallegos)


Other Provider: 





I evaluated and participated in the management of the patient.  I also 

evaluated the patient independently.  My co-signature indicates that I have 

reviewed this chart and I agree with the findings and plan of care as 

documented. 





My personal H&P findings include:   


73-year-old patient with history of atrial fibrillation, on Eliquis, 

chronically on oxygen who presents with hemoptysis.  Initially the concern was 

that the patient had had nose bleeding perhaps it just swallowed blood, however

, patient denies any history of a nosebleed and feels that she is coughing up 

blood-tinged mucus.  She denies any chest pain or significant shortness of 

breath.  She is a former smoker.  She has a history of asthma and COPD.





On examination, patient is quite pleasant, alert, and in no respiratory 

distress.  Breath sounds are equal bilaterally.  Heart is regular rate and 

rhythm.





Patient's CT scan demonstrates a large right hilar mass.  It has an appearance 

concerning for malignancy.





I discussed the CT scan with the patient as well as with her daughter.  We will 

admit the patient to the hospital for further evaluation as well as close 

observation of her hemoptysis.





 (Erica Regan)





- Data Points


Laboratory Results: 





 Laboratory Results





 07/31/18 16:20 





 07/31/18 16:20 








Medications Given: 





 





Acetaminophen (Tylenol)  1,000 mg PO Q6 PRN


   PRN Reason: Pain, Moderate


   Stop: 01/27/19 21:44


   Last Admin: 08/03/18 11:14 Dose:  1,000 mg


Albuterol (Proventil Inhaler)  2 puffs IH QID PRN


   PRN Reason: SHORTNESS OF BREATH


   Stop: 01/27/19 21:44


   Last Admin: 08/03/18 05:40 Dose:  2 puffs


Amlodipine Besylate (Norvasc)  5 mg PO DAILY [facility]


   Stop: 01/28/19 08:59


   Last Admin: 08/04/18 08:28 Dose:  Not Given


Apixaban (Eliquis)  5 mg PO BID [facility]


   Stop: 01/30/19 20:59


   Last Admin: 08/04/18 15:37 Dose:  5 mg


Aspirin (Aspirin)  325 mg PO DAILY [facility]


   Stop: 01/28/19 08:59


   Last Admin: 08/02/18 08:07 Dose:  Not Given


Atorvastatin Calcium (Lipitor)  20 mg PO HS [facility]


   Stop: 01/27/19 22:29


   Last Admin: 08/03/18 20:57 Dose:  20 mg


Benzonatate (Tessalon Pearles)  100 mg PO TID PRN


   PRN Reason: Cough, Mild


   Stop: 01/27/19 19:39


   Last Admin: 08/04/18 15:37 Dose:  100 mg


Clonazepam (Klonopin)  0.25 - 0.5 mg PO HS PRN


   PRN Reason: Sleep/Insomnia


   Stop: 01/27/19 21:44


   Last Admin: 08/02/18 18:06 Dose:  0.25 mg


Escitalopram Oxalate (Lexapro)  10 mg PO DAILY [facility]


   Stop: 01/28/19 08:59


   Last Admin: 08/04/18 08:27 Dose:  10 mg


Fenofibrate (Tricor)  145 mg PO DAILY [facility]


   Stop: 01/28/19 08:59


   Last Admin: 08/04/18 08:27 Dose:  145 mg


Furosemide (Lasix)  20 mg PO DAILY [facility]


   Stop: 01/28/19 08:59


   Last Admin: 08/04/18 08:27 Dose:  20 mg


Guaifenesin (Mucinex)  600 mg PO BID [facility]


   Stop: 01/28/19 08:59


   Last Admin: 08/04/18 08:27 Dose:  600 mg


Metoprolol Tartrate (Lopressor)  50 mg PO BID [facility]


   Stop: 01/28/19 08:59


   Last Admin: 08/04/18 08:28 Dose:  Not Given


Fluticasone/Salmeterol (Advair)  1 puffs IH BID [facility]


   Stop: 01/28/19 08:59


   Last Admin: 08/04/18 08:37 Dose:  1 puffs


Sucralfate (Carafate)  1 gm PO QID PRN


   PRN Reason: UPSET STOMACH


   Stop: 01/27/19 21:44


   Last Admin: 08/02/18 16:44 Dose:  1 gm


Tiotropium Bromide (Spiriva Handihaler)  18 mcg IH DAILY@12 [facility]


   Stop: 01/28/19 11:59


   Last Admin: 08/04/18 11:55 Dose:  1 inh





Discontinued Medications





Epinephrine HCl (Epinephrine)  2 mg SUBMUCOSAL .STK-MED ONE


   Stop: 08/02/18 14:30


   Last Admin: 08/02/18 14:29 Dose:  2 mg


Fentanyl (Sublimaze)  100 mcg IVP .STK-MED ONE


   Stop: 08/02/18 14:31


   Last Admin: 08/02/18 14:30 Dose:  100 mcg


Guaifenesin (Mucinex)  1,200 mg PO BID [facility]


   Stop: 01/27/19 20:59


   Last Admin: 07/31/18 21:52 Dose:  1,200 mg


Potassium Chloride/Sodium Chloride (Ns W/ 20 Kcl/L)  1,000 mls @ 75 mls/hr IV 

CONT [facility]


   Stop: 01/28/19 10:44


   Last Admin: 08/02/18 00:20 Dose:  1,000 mls


Sodium Chloride (Ns)  500 mls @ 25 mls/hr IV ONCALL ONE


   Stop: 08/03/18 08:57


   Last Admin: 08/02/18 15:32 Dose:  Not Given


Sodium Chloride (Ns)  500 mls @ 1,500 mls/hr IV ONCE ONE


   Stop: 08/04/18 11:26


   Last Admin: 08/04/18 11:55 Dose:  500 mls


Levofloxacin (Levaquin)  750 mg PO DAILY AT 10AM [facility]


   PRN Reason: Protocol


   Stop: 08/31/18 09:59


   Last Admin: 08/02/18 10:16 Dose:  750 mg


Midazolam HCl (Versed)  3 mg IVP .STK-MED ONE


   Stop: 08/02/18 14:31


   Last Admin: 08/02/18 14:30 Dose:  3 mg








Departure





- Departure


Disposition: [facility]


Clinical Impression: 


 History of atrial fibrillation, Chronic anticoagulation, Dependence on 

supplemental oxygen, right hilar mass, Hemoptysis





Condition: Fair

## 2018-07-31 NOTE — GHP
[f rep st]



                                                            HISTORY AND PHYSICAL





DATE OF ADMISSION:  07/31/2018



CHIEF COMPLAINT:  Coughing up blood.



HISTORY OF PRESENT ILLNESS:  This is a 73-year-old female with history of COPD, congestive heart fail
ure, AFib, hypertension, who presented to the hospital with hemoptysis.  The patient has had 4 COPD e
xacerbations in the last year.  She was hospitalized in April of this year with right middle/right lo
wer lobe pneumonia. 



The patient was started on levofloxacin last week for presumed upper respiratory infection/COPD exace
rbation.  Her respiratory status has been stable.  She denies any fevers or chills.  She denies any c
hest pain.  Today, she coughed up some blood and came to the emergency department for further evaluat
ion.



PAST MEDICAL HISTORY:  COPD, hypertension, atrial fibrillation, acute on chronic hypoxemic respirator
y failure, right-sided heart failure, hospitalizations for community-acquired pneumonia, acute kidney
 injury.



HOME MEDICATIONS:  Reviewed.  Refer to [facility] for details.



ALLERGIES:  Penicillin.



SOCIAL HISTORY:  The patient is a former smoker.  She denies any alcohol or illicit drug use.



FAMILY HISTORY:  Reviewed and noncontributory.



REVIEW OF SYSTEMS:  Comprehensive 10-point review of systems was done and is negative, except as ment
ioned in the HPI.



PHYSICAL EXAM:  VITAL SIGNS:  Blood pressure 111/67, pulse of 94, respiratory rate 16, O2 sat 95% on 
2 L, temperature afebrile.  GENERAL:  No acute distress.  HEAD:  Normocephalic, atraumatic.  EYES:  P
ERRLA.  Sclerae anicteric.  MOUTH:  Moist mucous membranes.  NECK:  Supple.  No lymphadenopathy.  CAR
DIOVASCULAR:  S1, S2 no JVD.  No lower extremity edema.  PULMONARY:  Diminished breath sounds in the 
right base.  No respiratory distress.  ABDOMEN:  Soft, nontender, nondistended.  No guarding or rebou
nd tenderness.  Normoactive bowel sounds.  EXTREMITIES:  No clubbing or cyanosis.  NEURO:  Cranial ne
rves 2-12 grossly intact.  No focal motor or sensory deficits.  SKIN:  Clear, no rashes.



DIAGNOSTICS:  WBC 6.79, hemoglobin 11, hematocrit 33.5, platelets 199.  Sodium 133, potassium 4.8, ch
loride 96, BUN 43, creatinine 1.4, glucose 120.  Chest CT done today, which I visualized and personal
ly interpreted, shows infiltrative right hilar mass suspicious for primary bronchogenic carcinoma wit
h near complete collapse of the right middle lobe and minimal postobstructive consolidation in the ri
ght upper lobe.  Please refer to report for full details.



ASSESSMENT AND PLAN:  This is a 73-year-old female with history of chronic obstructive pulmonary dise
ase and chronic respiratory failure presenting with: 

1.  Hemoptysis, most likely due to newly diagnosed infiltrative right hilar mass suspicious for prima
ry bronchogenic carcinoma.  Plan:  I discussed case with Dr. Reyes Roland from pulmonology who will see 
the patient in the morning.  For now, will hold dose of Eliquis pending bronchoscopy.

2.  Suspected postobstructive pneumonia.  Plan:  We will continue current dose of levofloxacin.

3.  History of atrial fibrillation.  Plan:  Once again, Eliquis will be held.  We will continue home 
rate-controlling medications. 

The patient requests to be full code status.





Job #:  [id number]

## 2018-08-01 LAB — PLATELET # BLD: 175 10^3/UL (ref 150–400)

## 2018-08-01 RX ADMIN — METOPROLOL TARTRATE SCH MG: 50 TABLET, FILM COATED ORAL at 09:56

## 2018-08-01 RX ADMIN — ASPIRIN SCH: 325 TABLET, FILM COATED ORAL at 09:57

## 2018-08-01 RX ADMIN — ATORVASTATIN CALCIUM SCH MG: 20 TABLET, FILM COATED ORAL at 00:08

## 2018-08-01 RX ADMIN — FUROSEMIDE SCH MG: 20 TABLET ORAL at 09:55

## 2018-08-01 RX ADMIN — ATORVASTATIN CALCIUM SCH MG: 20 TABLET, FILM COATED ORAL at 20:10

## 2018-08-01 RX ADMIN — FENOFIBRATE SCH MG: 145 TABLET ORAL at 09:55

## 2018-08-01 RX ADMIN — GUAIFENESIN SCH MG: 600 TABLET, EXTENDED RELEASE ORAL at 20:10

## 2018-08-01 RX ADMIN — GUAIFENESIN SCH MG: 600 TABLET, EXTENDED RELEASE ORAL at 09:55

## 2018-08-01 RX ADMIN — METOPROLOL TARTRATE SCH MG: 50 TABLET, FILM COATED ORAL at 20:10

## 2018-08-01 RX ADMIN — TIOTROPIUM BROMIDE SCH INH: 18 CAPSULE ORAL; RESPIRATORY (INHALATION) at 09:33

## 2018-08-01 RX ADMIN — FLUTICASONE PROPIONATE AND SALMETEROL SCH PUFFS: 50; 500 POWDER RESPIRATORY (INHALATION) at 09:33

## 2018-08-01 RX ADMIN — FLUTICASONE PROPIONATE AND SALMETEROL SCH PUFFS: 50; 500 POWDER RESPIRATORY (INHALATION) at 20:13

## 2018-08-01 NOTE — PDMN
Medical Necessity


Medical necessity: Pt meets IP criteria per MD; est los >2 mn for eval/tx of 

suspected postobstructive pneumonia & hemoptysis likely r/t infiltrative R 

hilar mass suspicious for bronchogenic carcinoma; admit for further monitoring, 

Pulmonology consult, bronchoscopy, IVFs & supportive care; comorbid advanced age

, COPD, CHF, AFIB, HTN, KENNETH; per H&P & order 731/[id number]

## 2018-08-01 NOTE — ASMTCMCOM
CM Note

 

CM Note                       

Notes:

(continuation from last note)Pt has 2 other daughters; her oldest daughter has bi-polar 

disorder.  Pt has been told that she may have cancer and is awaiting tests for confirmation.  Her 

present mood is bright, "I've lived a good life", but she acknowledges the possibility that if the 

cancer is confirmed, this may change and she may experience a multitude of emotions.  She seems 

most concerned for her daughter Jasmin, who lives here and is sensitive and empathetic.  Coco from 

[facility] called to inquire about Pt.  Pt does not want her medical inf to be shared with Coco 

and it was agreed CM will call and simply confirm that Pt is here and undergoing tests.  Pt asked 

for the number to [facility] so she may speak to them herself.  CM will follow.



 D/C Plan:  TBD

 

Date Signed:  08/01/2018 10:10 AM

Electronically Signed By:Fela Oneil

## 2018-08-01 NOTE — GCON
[f rep st]



                                                                    CONSULTATION





PULMONARY CONSULTATION



DATE OF CONSULTATION:  08/01/2018



HISTORY OF PRESENT ILLNESS:  This patient is a 73-year-old female with a history of known COPD and an
 FEV1 of about 49% of predicted, who has had several COPD exacerbations over the last year.  The most
 recent one was in April of 2018.  She was treated with antibiotics and got better.  In the last week
, however, she developed upper respiratory symptoms, was treated with Levaquin as an outpatient.  She
 said that she felt substantially better after taking the antibiotic, but on day of admission complai
jean claude of blood-streaked sputum.  Her vital signs were quite stable.  A chest x-ray showed fullness in t
he right hilum, and her white count was normal.  A CT scan was performed that, unfortunately, reveale
d an obstructing mass causing right middle lobe atelectasis and with great concerns for malignancy.  
In terms of symptoms, she says that her breathing is at its baseline.  She denies any unanticipated w
eight loss.  No appetite changes.  She did have an episode of hemoptysis about a week and a half ago,
 which she attributed to epistaxis due to her chronic oxygen use.  She was last seen by me in clinic 
on 06/05/2018, and was feeling quite well at that time.  I was also concerned about sleep apnea and s
uggested a sleep study, but she declined.  She also has a history of atrial fibrillation and has been
 on Eliquis until yesterday.



REVIEW OF SYSTEMS:  Otherwise negative.



PAST MEDICAL HISTORY:  Includes:

1.  COPD as described above with exacerbations in June 2017, September 2017, October 2017, December 2
017, and April 2018 plus now.  

2.  History of anemia of chronic disease.

3.  Anxiety.

4.  Atrial fibrillation.

5.  Obesity.

6.  Hypertension.

7.  Hypoxemia.

8.  Hyperlipidemia.

9.  History of viral pneumonia.

10.  Urinary incontinence.



PAST SURGICAL HISTORY:  Includes back surgery.



OUTPATIENT MEDICATIONS:  Include Advair, albuterol, amlodipine, Lexapro, ProAir, ramipril, Spiriva, T
ylenol, aspirin, atorvastatin, Zyrtec, clonazepam, Eliquis, Pepcid, fenofibrate, Lasix 20 a day, meto
prolol, rosuvastatin and sucralfate.



SOCIAL HISTORY:  She is a former smoker, but no significant alcohol or IV drug use.



FAMILY HISTORY:  Noncontributory at this time.



PHYSICAL EXAMINATION:  VITAL SIGNS:  She has been afebrile since arrival.  Her blood pressure 110/57,
 heart rate 93, respirations 18, oxygen saturation 95% on 4 L.  She is a very pleasant, obese female 
in no apparent distress.  She was able to speak in full sentences without using accessory muscles for
 breathing.  Pupils were equally round and reactive to light nonicteric and noninjected.  Mucous memb
ranes moist without erythema or exudate.  NECK:  Supple without adenopathy or jugular vein distention
.  Breath sounds were remarkably clear to auscultation bilaterally without wheezes, rubs or rales.  H
EART:  Regular rate and rhythm without obvious murmur.  ABDOMEN:  Soft, nontender, nondistended witho
ut hepatosplenomegaly.  EXTREMITIES:  Shoe no clubbing, cyanosis, or edema.  NEUROLOGIC:  Nonfocal.  
SKIN:  Warm and dry without evidence of rash.



OBJECTIVE DATA:  Includes CT scan as described above.  Her white count is 6.27, hematocrit 29.7, and 
platelets of 175.  Basic metabolic panel was unremarkable.  Creatinine is 1.3.  INR was 1.53 yesterda
y.



ASSESSMENT AND PLAN:  Right upper lobe mass in a patient at very high risk for lung cancer, and I thi
nk a bronchoscopy is warranted at this time.  I would like to give her 24 hours to let the Eliquis we
ar off in its entirety to minimize the risk of intrabronchial bleeding.  I discussed the findings wit
h the patient in some detail.  She agreed to proceed, and we will arrange for transbronchial lung bio
psy tomorrow and proceed accordingly.





Job #:  [id number]

## 2018-08-01 NOTE — HOSPPROG
Hospitalist Progress Note


Assessment/Plan: 





#Right Hilar Mass, concerning for primary bronchogenic carcinoma


-Awaiting Bronchoscopy, will prob not be done until tomorrow





#Hemoptysis





#Chronic resp failure, baseline is 4 L at rest, 5 with exertion


-at baseline





#Hx of COPD, not in exacerbation





#Hx of CHF





#Afib





#HTN





#Pneumonia: on Levaquin, started as outpatient





Plan:





-Bronchoscopy per Pulm


-Hold Eliquis


-Cont abx


-Inpatient


Subjective: minimal hemotysis. awaiting bronchoscopy.


Objective: 


 Vital Signs











Temp Pulse Resp BP Pulse Ox


 


 37.0 C   93   18   110/57 L  95 


 


 08/01/18 11:47  08/01/18 11:47  08/01/18 11:47  08/01/18 11:47  08/01/18 11:47








 Laboratory Results





 08/01/18 03:55 





 08/01/18 03:55 





 











 07/31/18 08/01/18 08/02/18





 05:59 05:59 05:59


 


Intake Total  300 


 


Output Total  1150 


 


Balance  -850 








 











PT  18.5 SEC (12.0-15.0)  H  07/31/18  16:20    


 


INR  1.53  (0.83-1.16)  H  07/31/18  16:20    














- Physical Exam


Constitutional: no apparent distress


Eyes: PERRL, EOMI


Ears, Nose, Mouth, Throat: moist mucous membranes, hearing normal


Cardiovascular: regular rate and rhythym, No edema


Respiratory: reduced air movement


Gastrointestinal: normoactive bowel sounds, soft, non-tender abdomen


Skin: warm


Musculoskeletal: full muscle strength


Neurologic: AAOx3


Psychiatric: interacting appropriately, not anxious, not encephalopathic


Lymph, Heme, Immunologic: No petechiae





ICD10 Worksheet


Patient Problems: 


 Problems











Problem Status Onset


 


Chronic anticoagulation Acute  


 


Dependence on supplemental oxygen Acute  


 


History of atrial fibrillation Acute  


 


Atrial fibrillation Acute  


 


Pleural effusion Acute  


 


Pneumonia Acute  


 


Shortness of breath Acute  


 


chronic disease mgmt/transitional care Acute

## 2018-08-02 LAB — PLATELET # BLD: 173 10^3/UL (ref 150–400)

## 2018-08-02 PROCEDURE — 0BBC8ZX EXCISION OF RIGHT UPPER LUNG LOBE, VIA NATURAL OR ARTIFICIAL OPENING ENDOSCOPIC, DIAGNOSTIC: ICD-10-PCS | Performed by: INTERNAL MEDICINE

## 2018-08-02 RX ADMIN — METOPROLOL TARTRATE SCH MG: 50 TABLET, FILM COATED ORAL at 21:15

## 2018-08-02 RX ADMIN — FLUTICASONE PROPIONATE AND SALMETEROL SCH PUFFS: 50; 500 POWDER RESPIRATORY (INHALATION) at 08:14

## 2018-08-02 RX ADMIN — GUAIFENESIN SCH MG: 600 TABLET, EXTENDED RELEASE ORAL at 08:08

## 2018-08-02 RX ADMIN — FENOFIBRATE SCH MG: 145 TABLET ORAL at 08:08

## 2018-08-02 RX ADMIN — ASPIRIN SCH: 325 TABLET, FILM COATED ORAL at 08:07

## 2018-08-02 RX ADMIN — GUAIFENESIN SCH MG: 600 TABLET, EXTENDED RELEASE ORAL at 21:15

## 2018-08-02 RX ADMIN — FLUTICASONE PROPIONATE AND SALMETEROL SCH PUFFS: 50; 500 POWDER RESPIRATORY (INHALATION) at 20:41

## 2018-08-02 RX ADMIN — TIOTROPIUM BROMIDE SCH INH: 18 CAPSULE ORAL; RESPIRATORY (INHALATION) at 09:08

## 2018-08-02 RX ADMIN — METOPROLOL TARTRATE SCH MG: 50 TABLET, FILM COATED ORAL at 08:08

## 2018-08-02 RX ADMIN — ATORVASTATIN CALCIUM SCH MG: 20 TABLET, FILM COATED ORAL at 21:15

## 2018-08-02 RX ADMIN — FUROSEMIDE SCH MG: 20 TABLET ORAL at 08:08

## 2018-08-02 NOTE — BVPULMO
[facility]

Surgical Services- Pulmonology

_____________________________________________________________________________________________________
_______

Patient Name: Nathalia Larson                         Procedure Date: 8/2/2018 1:25 PM

MRN: [id number]                                      Account Number: [id number]

Patient Type: Inpatient                              Attending MD/ER Physician: Luis Roland MD

_____________________________________________________________________________________________________
_______

 

Procedure:            Bronchoscopy

Indications:          Lung mass

Providers:            Luis Roland MD

Medicines:            Lidocaine 1% applied to cords 2 mL, Lidocaine 1% applied to the tracheobronchia
l 

                      tree 4 mL, Fentanyl 100 mcg IV, Midazolam 3 mg mg IV

Complications:        No immediate complications

Procedure:            After informed consent, a time out was performed. N95 masks were worn, and the 


                      procedure was done in a negative pressure room. The patient was given appropria
te 

                      topical anesthesia and intravenous sedation. The fiberopic bronchoscope was pas
sed 

                      via a bite block orally into the larynx and subsequently into the lower trachea
 

                      bronchial tree. Throughout the procedure, the patient's blood pressure, pulse, 
and 

                      oxygen saturations were monitored continuously. The Bronchoscope (Video) was 

                      introduced through the mouth and advanced to the tracheobronchial tree of both 


                      lungs. The procedure was accomplished without difficulty. The patient tolerated
 the 

                      procedure well.

Findings:             Right Lung Abnormalities: Extrinsic compression was found in the right upper lo
be. 

                      The airway lumen is occluded. The lesion was not traversed. A nearly obstructin
g 

                      (greater than 90% obstructed) mass was found in the right upper lobe. The mass 
was 

                      endobronchial. Endobronchial biopsies were performed in the right upper lobe us
ing 

                      forceps and sent for routine cytology. Two samples were obtained. Estimated blo
od 

                      loss: minimal. Endobronchial biopsies were performed in the right upper lobe us
ing 

                      forceps and sent for histopathology examination. Two samples were obtained. 

                      Estimated blood loss: minimal.

                      Washings were obtained in the right upper lobe and sent for routine cytology. T
he 

                      return was bloody.

Post Op Diagnosis:    - Lung mass

                      - Extrinsic compression was found in the right upper lobe.

                      - An endobronchial mass was found in the right upper lobe.

                      - An endobronchial biopsy was performed.

                      - An endobronchial biopsy was performed.

                      - Washings were obtained.

Estimated Blood Loss: Estimated blood loss was minimal.

Recommendation:       - [facility].

                      - The patient was advised to call or return to the clinic if there are signs or
 

                      symptoms suggesting a complication/adverse reaction from the procedure.

 

Luis Roland MD

_________________

Luis Roland MD

8/2/2018 3:12:12 PM

This report has been signed electronicallyLuis Roland MD

Number of Addenda: 0

 

Note Initiated On: 8/2/2018 1:25 PM

[email or web address]

## 2018-08-02 NOTE — PDPROPOC
Sedation Plan of Care


Sedation Plan of Care: vital signs stable, mental status noted, patient 

educated of risks, benefits, alternatives, patient can tolerate sedation


ASA Classification: ASA 2


Planned drugs: fentanyl, midazolam


Mallampati Score: Class 3


Mallampati Reference Image:

## 2018-08-02 NOTE — PDINTPN
Intensivist Progress Note


Assessment/Plan: 











73 F known to me from clinic with COPD and frequent exacerbations recently 

treated for PNA with clinical improvement but admitted with hemoptysis and 

found to have right hilar mass on CT. 





* Lung mass- jhighly suspicious for malignancy and OK for bronch. Held eliquis 

24 hours to decrease risk of bleeding


* COPD stable


* O2- 2/2 COPD and proable PAT. She has refused previous sleep study requests. 














Subjective: 





no events


Objective: 





 Vital Signs











Temp Pulse Resp BP Pulse Ox


 


 36.9 C   91   16   124/69 H  96 


 


 08/02/18 12:51  08/02/18 12:51  08/02/18 12:51  08/02/18 12:51  08/02/18 12:51








 Laboratory Results





 08/02/18 04:19 





 08/01/18 03:55 





 











 08/01/18 08/02/18 08/03/18





 05:59 05:59 05:59


 


Intake Total 300 1240 


 


Output Total 1150  


 


Balance -850 1240 








 











PT  18.5 SEC (12.0-15.0)  H  07/31/18  16:20    


 


INR  1.53  (0.83-1.16)  H  07/31/18  16:20    














Physical Exam





- Physical Exam


General Appearance: alert, no apparent distress, obese


EENT: PERRL/EOMI


Neck: supple


Respiratory: lungs clear, normal breath sounds, No respiratory distress, No 

accessory muscle use


Cardiac/Chest: regular rate, rhythm, No edema


Abdomen: non-tender, soft, No distended


Skin: normal color, warm/dry, No cyanosis


Lymphatic: no adenopathy


Extremities: No pedal edema


Neuro/Psych: alert, normal mood/affect, oriented x 3





ICD10 Worksheet


Patient Problems: 


 Problems











Problem Status Onset


 


Chronic anticoagulation Acute  


 


Dependence on supplemental oxygen Acute  


 


History of atrial fibrillation Acute  


 


Atrial fibrillation Acute  


 


Pleural effusion Acute  


 


Pneumonia Acute  


 


Shortness of breath Acute  


 


chronic disease mgmt/transitional care Acute

## 2018-08-02 NOTE — HOSPPROG
Hospitalist Progress Note


Assessment/Plan: 





#Right Hilar Mass, concerning for primary bronchogenic carcinoma


-Awaiting Bronchoscopy today


-Holding Eliquis and Aspirin





#Hemoptysis





#Chronic resp failure, baseline is 4 L at rest, 5 with exertion


-at baseline





#Hx of COPD, not in exacerbation





#Hx of CHF, on home meds





#Afib





#HTN





#Pneumonia: on Levaquin, started as outpatient. Can likely stop today given on 

leukocytosis and unclear duration. Will check a procalcitonin to help guide abx 

duration








IVF while NPO, can stop once tolerating a diet. 


Subjective: no cp or sob. Awaiting bronchoscopy


Objective: 


 Vital Signs











Temp Pulse Resp BP Pulse Ox


 


 36.9 C   91   16   124/69 H  96 


 


 08/02/18 12:51  08/02/18 12:51  08/02/18 12:51  08/02/18 12:51  08/02/18 12:51








 Laboratory Results





 08/02/18 04:19 





 08/01/18 03:55 





 











 08/01/18 08/02/18 08/03/18





 05:59 05:59 05:59


 


Intake Total 300 1240 


 


Output Total 1150  


 


Balance -850 1240 








 











PT  18.5 SEC (12.0-15.0)  H  07/31/18  16:20    


 


INR  1.53  (0.83-1.16)  H  07/31/18  16:20    














- Physical Exam


Constitutional: no apparent distress


Eyes: PERRL, EOMI


Ears, Nose, Mouth, Throat: moist mucous membranes, hearing normal, ears appear 

normal


Cardiovascular: No edema


Respiratory: no respiratory distress, no rales or rhonchi, clear to auscultation


Gastrointestinal: normoactive bowel sounds, soft, non-tender abdomen


Skin: warm


Neurologic: AAOx3


Psychiatric: interacting appropriately, not anxious, not encephalopathic


Lymph, Heme, Immunologic: No petechiae





ICD10 Worksheet


Patient Problems: 


 Problems











Problem Status Onset


 


Chronic anticoagulation Acute  


 


Dependence on supplemental oxygen Acute  


 


History of atrial fibrillation Acute  


 


Atrial fibrillation Acute  


 


Pleural effusion Acute  


 


Pneumonia Acute  


 


Shortness of breath Acute  


 


chronic disease mgmt/transitional care Acute

## 2018-08-03 LAB — PLATELET # BLD: 179 10^3/UL (ref 150–400)

## 2018-08-03 RX ADMIN — ATORVASTATIN CALCIUM SCH MG: 20 TABLET, FILM COATED ORAL at 20:57

## 2018-08-03 RX ADMIN — FLUTICASONE PROPIONATE AND SALMETEROL SCH PUFFS: 50; 500 POWDER RESPIRATORY (INHALATION) at 20:58

## 2018-08-03 RX ADMIN — APIXABAN SCH: 5 TABLET, FILM COATED ORAL at 21:03

## 2018-08-03 RX ADMIN — METOPROLOL TARTRATE SCH MG: 50 TABLET, FILM COATED ORAL at 20:57

## 2018-08-03 RX ADMIN — FLUTICASONE PROPIONATE AND SALMETEROL SCH PUFFS: 50; 500 POWDER RESPIRATORY (INHALATION) at 09:22

## 2018-08-03 RX ADMIN — METOPROLOL TARTRATE SCH MG: 50 TABLET, FILM COATED ORAL at 08:13

## 2018-08-03 RX ADMIN — TIOTROPIUM BROMIDE SCH INH: 18 CAPSULE ORAL; RESPIRATORY (INHALATION) at 09:21

## 2018-08-03 RX ADMIN — ACETAMINOPHEN PRN MG: 500 TABLET ORAL at 11:14

## 2018-08-03 RX ADMIN — FUROSEMIDE SCH MG: 20 TABLET ORAL at 08:13

## 2018-08-03 RX ADMIN — GUAIFENESIN SCH MG: 600 TABLET, EXTENDED RELEASE ORAL at 20:57

## 2018-08-03 RX ADMIN — APIXABAN SCH MG: 5 TABLET, FILM COATED ORAL at 20:57

## 2018-08-03 RX ADMIN — GUAIFENESIN SCH MG: 600 TABLET, EXTENDED RELEASE ORAL at 08:14

## 2018-08-03 RX ADMIN — FENOFIBRATE SCH MG: 145 TABLET ORAL at 08:13

## 2018-08-03 NOTE — PDDCSUM
Discharge Summary


Discharge Summary: 





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 

hilar mass. She underwent bronchoscopy per Dr. Roland. Biopsies are pending and 

she will f/u with Dr. Roland one week from Monday. Otherwise she is at baseline 

in regards to her other MMP.





She has previously been getting treatment for CAP and was on Levaquin and this 

has been stopped. 





Please see below for details per problem list. 





DDX:


#Right Hilar Mass, concerning for primary bronchogenic carcinoma


-f/u biopsies


-ok to restart Eliquis





#Hemoptysis, resolved





#Chronic resp failure, baseline is 4 L at rest, 5 with exertion


-at baseline





#Hx of COPD, not in exacerbation





#Hx of CHF, on home meds. Euvolemic on discharge





#Afib





#HTN





#Pneumonia: no e/o of pneumonia here. Levaquin stopped





Exam


NAD


AAOX3


RRR


DECREASED LUNG SOUNDS


S/NT/ND


NO EDEMA





MEDS: SEE MED REC





F/U: WITH DR. ROLAND PER ABOVE





TOTAL TIME SPENT ON DISCHARGE IS 35 MINS

## 2018-08-03 NOTE — PDINTPN
Intensivist Progress Note


Assessment/Plan: 











73 F known to me from clinic with COPD and frequent exacerbations recently 

treated for PNA with clinical improvement but admitted with hemoptysis and 

found to have right hilar mass on CT. 





* Lung mass- s/p endobronchial biopsy, brush, wash. Path still pending. She had 

moderate bleeding during biopsies, likely related to eliquis, which effected 

the number of samples I could get. Await path. 


* hemoptysis- stable and 2/2 lung mass. 


* COPD stable


* O2- 2/2 COPD and probable PAT. She has refused previous sleep study requests. 


* Dispo: OK for dc home today- she should have FU with me a week from monday 

unless I hear from path sooner. OK to overbook














08/03/18 12:30





Subjective: 





no events


Objective: 





 Vital Signs











Temp Pulse Resp BP Pulse Ox


 


 36.6 C   91   16   121/54 H  93 


 


 08/03/18 11:16  08/03/18 11:16  08/03/18 11:16  08/03/18 11:16  08/03/18 11:16








 Microbiology











 08/02/18 14:00 Gram Stain - Final





 Lung Right Upper Lobe - Bronchial Washings 








 Laboratory Results





 08/03/18 04:32 





 08/01/18 03:55 





 











 08/02/18 08/03/18 08/04/18





 05:59 05:59 05:59


 


Intake Total 1240 640 


 


Balance 1240 640 








 











PT  18.5 SEC (12.0-15.0)  H  07/31/18  16:20    


 


INR  1.53  (0.83-1.16)  H  07/31/18  16:20    














Physical Exam





- Physical Exam


General Appearance: alert, no apparent distress, obese


EENT: PERRL/EOMI


Neck: supple


Respiratory: lungs clear, decreased breath sounds, No respiratory distress, No 

accessory muscle use


Cardiac/Chest: regular rate, rhythm, No edema


Abdomen: non-tender, soft, No distended


Skin: normal color, warm/dry, No cyanosis


Lymphatic: no adenopathy


Extremities: No pedal edema


Neuro/Psych: alert, normal mood/affect, oriented x 3





ICD10 Worksheet


Patient Problems: 


 Problems











Problem Status Onset


 


Chronic anticoagulation Acute  


 


Dependence on supplemental oxygen Acute  


 


History of atrial fibrillation Acute  


 


Atrial fibrillation Acute  


 


Pleural effusion Acute  


 


Pneumonia Acute  


 


Shortness of breath Acute  


 


chronic disease mgmt/transitional care Acute

## 2018-08-03 NOTE — PDIAF
- Diagnosis


Diagnosis: right hilar mass


Code Status: Full Code





- Medication Management


Discharge Medications: 


 Medications to Continue on Transfer





Albuterol Hfa Anes Only [Proair Hfa Icu (*)] 1 - 2 puffs IH QID PRN 04/14/18 [

Last Taken Unknown]


Cetirizine [ZyrTEC 10 mg (*)] 10 mg PO DAILY PRN 04/14/18 [Last Taken 04/13/18]


Escitalopram Oxalate [Lexapro 10 MG] 10 mg PO DAILY 04/14/18 [Last Taken 07/31/ 18]


Famotidine [Pepcid 20 MG (*)] 20 mg PO BID PRN 04/14/18 [Last Taken 07/31/18 am 

dose only]


Fenofibrate [Tricor 145 mg (*)] 145 mg PO DAILY 04/14/18 [Last Taken 07/31/18]


Fluticasone/Salmeter 500/50Mcg [Advair 500/50 (*)] 1 puffs IH BID 04/14/18 [

Last Taken 07/31/18 am dose only]


Sucralfate [Carafate 1 GM (*)] 1 gm PO QID PRN 04/14/18 [Last Taken 07/31/18]


Tiotropium Inhaler [Spiriva Inhaler (RX)] 1 inh IH DAILY@12 04/14/18 [Last 

Taken 07/31/18]


amLODIPine BESYLATE [Norvasc 5 mg (*)] 5 mg PO DAILY 04/14/18 [Last Taken 07/31/ 18]


clonazePAM [Klonopin (*)] 0.25 - 0.5 mg PO HS PRN 04/14/18 [Last Taken 07/30/18]


Acetaminophen [Tylenol  mg (*)] 1,000 mg PO Q6 PRN 07/31/18 [Last Taken 

Unknown]


Albuterol Sulfate [Ventolin Hfa] 2 puffs IH Q6H PRN 07/31/18 [Last Taken Unknown

]


Apixaban [Eliquis] 5 mg PO BID 07/31/18 [Last Taken 07/31/18 am dose]


Atorvastatin Calcium [Lipitor 20 mg (*)] 20 mg PO HS 07/31/18 [Last Taken 07/30/ 18]


Benzonatate 200 mg PO TID 07/31/18 [Last Taken 07/31/18]


Furosemide [Lasix 20 MG (*)] 20 mg PO DAILY 07/31/18 [Last Taken 07/31/18]


Ipratropium/Albuterol [Duoneb (*)] 3 ml IH QID PRN 07/31/18 [Last Taken Unknown]


Ipratropium/Albuterol [Duoneb (*)] 3 ml IH QID PRN 07/31/18 [Last Taken Unknown]


Metoprolol Tartrate [Lopressor 50 mg (*)] 50 mg PO BID 07/31/18 [Last Taken 07/ 31/18 am dose only]


guaiFENesin [Mucinex 600 MG (*)] 600 mg PO BID 07/31/18 [Last Taken 07/31/18 am 

dose only]








Discharge Medications: Refer to the Discharge Home Medication list for PRN 

reason.





- Orders


Services needed: Physical Therapy


Isolation Type: None


Diet Recommendation: no restrictions on diet


Diet Texture: Regular Texture Diet


Additional Instructions: 


Activity as tolerated





F/U:


1) with Dr. Roland on August 13, please call for appointment. 





Biopsies results will be provided to you by Dr. Roland


All antibiotics have been stopped


Ok to restart Eliquis


Hold off on restarting Aspirin until you f/u with Dr. Roland





Please seek medical care or come back to the emergency room if experiencing 

difficulty breathing, severe chest pain, unrelieved pain, unable to keep down 

food or liquids or if coughing up large amounts of blood. 











- Follow Up Care


Current Providers and Referrals: 


Nida Jensen MD [Primary Care Provider] -

## 2018-08-03 NOTE — ASMTCMCOM
CM Note

 

CM Note                       

Notes:

Physician wrote D/C orders.  [facility] notified.  They stated that they would need to evaluate Pt 


on Monday.  Nurse not available after 2:30PM. Pt and daughter told.  CM to follow.



D/C Plan:  [facility]ipate [facility]

 

 

Date Signed:  08/03/2018 03:31 PM

Electronically Signed By:Fela Oneil

## 2018-08-04 LAB — PLATELET # BLD: 179 10^3/UL (ref 150–400)

## 2018-08-04 RX ADMIN — BENZONATATE PRN MG: 100 CAPSULE, LIQUID FILLED ORAL at 00:57

## 2018-08-04 RX ADMIN — METOPROLOL TARTRATE SCH: 50 TABLET, FILM COATED ORAL at 22:05

## 2018-08-04 RX ADMIN — FLUTICASONE PROPIONATE AND SALMETEROL SCH: 50; 500 POWDER RESPIRATORY (INHALATION) at 22:36

## 2018-08-04 RX ADMIN — FUROSEMIDE SCH MG: 20 TABLET ORAL at 08:27

## 2018-08-04 RX ADMIN — APIXABAN SCH MG: 5 TABLET, FILM COATED ORAL at 15:37

## 2018-08-04 RX ADMIN — BENZONATATE PRN MG: 100 CAPSULE, LIQUID FILLED ORAL at 22:01

## 2018-08-04 RX ADMIN — APIXABAN SCH MG: 5 TABLET, FILM COATED ORAL at 22:01

## 2018-08-04 RX ADMIN — ACETAMINOPHEN PRN MG: 500 TABLET ORAL at 22:01

## 2018-08-04 RX ADMIN — GUAIFENESIN SCH MG: 600 TABLET, EXTENDED RELEASE ORAL at 22:01

## 2018-08-04 RX ADMIN — FLUTICASONE PROPIONATE AND SALMETEROL SCH PUFFS: 50; 500 POWDER RESPIRATORY (INHALATION) at 08:37

## 2018-08-04 RX ADMIN — TIOTROPIUM BROMIDE SCH INH: 18 CAPSULE ORAL; RESPIRATORY (INHALATION) at 11:55

## 2018-08-04 RX ADMIN — GUAIFENESIN SCH MG: 600 TABLET, EXTENDED RELEASE ORAL at 08:27

## 2018-08-04 RX ADMIN — BENZONATATE PRN MG: 100 CAPSULE, LIQUID FILLED ORAL at 15:37

## 2018-08-04 RX ADMIN — METOPROLOL TARTRATE SCH: 50 TABLET, FILM COATED ORAL at 08:28

## 2018-08-04 RX ADMIN — BENZONATATE PRN MG: 100 CAPSULE, LIQUID FILLED ORAL at 06:07

## 2018-08-04 RX ADMIN — FENOFIBRATE SCH MG: 145 TABLET ORAL at 08:27

## 2018-08-04 RX ADMIN — ATORVASTATIN CALCIUM SCH MG: 20 TABLET, FILM COATED ORAL at 22:01

## 2018-08-04 NOTE — HOSPPROG
Hospitalist Progress Note


Assessment/Plan: 





#Right Hilar Mass, concerning for primary bronchogenic carcinoma


-s/p Bronchoscopy


-f/u biopsy with Dr. Roland


-restart Eliquis


-Restart Aspirin soon





#Hemoptysis, resolved





#Chronic resp failure, baseline is 4 L at rest, 5 with exertion


-at baseline





#Hx of COPD, not in exacerbation





#Hx of CHF, on home meds





#Afib





#HTN


-she had hypotension this morning. She was given stat IVF and BP is now ok. 

Holding BP meds. No signs or e/o infection. She feels great








#Pneumonia: resolved. s/p Levaquin








Inpatient


Awaiting placement


obtain PT eval 


Subjective: transient low BP. now better. no cp or sob. no e/o bleeding.


Objective: 


 Vital Signs











Temp Pulse Resp BP Pulse Ox


 


 36.4 C   89   16   123/70 H  97 


 


 08/04/18 11:20  08/04/18 11:20  08/04/18 11:20  08/04/18 11:20  08/04/18 11:20








 Microbiology











 08/02/18 14:00 Gram Stain - Final





 Lung Right Upper Lobe - Bronchial Washings Bronchial Washings Culture - Final


 


 08/02/18 14:00 Mycobacterial Smear (CHASE) - Final





 Lung Right Upper Lobe - Bronchial Washings 








 Laboratory Results





 08/04/18 04:32 





 08/04/18 11:55 





 











 08/03/18 08/04/18 08/05/18





 05:59 05:59 05:59


 


Intake Total 640 4000 


 


Balance 640 4000 








 











PT  18.5 SEC (12.0-15.0)  H  07/31/18  16:20    


 


INR  1.53  (0.83-1.16)  H  07/31/18  16:20    














- Physical Exam


Constitutional: no apparent distress


Eyes: PERRL, EOMI


Ears, Nose, Mouth, Throat: moist mucous membranes, hearing normal


Cardiovascular: regular rate and rhythym, No edema


Respiratory: no respiratory distress, no rales or rhonchi


Gastrointestinal: normoactive bowel sounds, soft, non-tender abdomen


Skin: warm


Neurologic: AAOx3


Psychiatric: interacting appropriately, not anxious, not encephalopathic





ICD10 Worksheet


Patient Problems: 


 Problems











Problem Status Onset


 


Chronic anticoagulation Acute  


 


Dependence on supplemental oxygen Acute  


 


History of atrial fibrillation Acute  


 


Atrial fibrillation Acute  


 


Pleural effusion Acute  


 


Pneumonia Acute  


 


Shortness of breath Acute  


 


chronic disease mgmt/transitional care Acute

## 2018-08-04 NOTE — ASMTCMCOM
CM Note

 

CM Note                       

Notes:

8/4/2018 Case Management Note



Discussed case with Dr. Hernandez.  PT eval ordered.



Met w/pt.  Pt lives at [facility] in the [place].    She takes her meals in the dining zavala, has RN med 


managment and assistance with ADL's.  In the past she has SNF rehab stay at the [facility] in 

[place].  Pt has had home care at [facility] but was unable to recall the name of the agency.



Pt would prefer to return to [facility], however if [facility] requests SNF rehab stay, she would 


prefer returning to the [facility] in [place].



Case Management d/c poc:  to be determined pending outcome of assessment from [facility] on 

Monday.



Case Management to follow.

 

Date Signed:  08/04/2018 02:27 PM

Electronically Signed By:Wendy Correa RN

## 2018-08-05 LAB — PLATELET # BLD: 173 10^3/UL (ref 150–400)

## 2018-08-05 RX ADMIN — ATORVASTATIN CALCIUM SCH MG: 20 TABLET, FILM COATED ORAL at 20:12

## 2018-08-05 RX ADMIN — APIXABAN SCH MG: 5 TABLET, FILM COATED ORAL at 08:16

## 2018-08-05 RX ADMIN — FLUTICASONE PROPIONATE AND SALMETEROL SCH PUFFS: 50; 500 POWDER RESPIRATORY (INHALATION) at 08:20

## 2018-08-05 RX ADMIN — FLUTICASONE PROPIONATE AND SALMETEROL SCH PUFFS: 50; 500 POWDER RESPIRATORY (INHALATION) at 21:44

## 2018-08-05 RX ADMIN — TIOTROPIUM BROMIDE SCH INH: 18 CAPSULE ORAL; RESPIRATORY (INHALATION) at 11:47

## 2018-08-05 RX ADMIN — METOPROLOL TARTRATE SCH MG: 50 TABLET, FILM COATED ORAL at 08:16

## 2018-08-05 RX ADMIN — METOPROLOL TARTRATE SCH: 25 TABLET, FILM COATED ORAL at 08:56

## 2018-08-05 RX ADMIN — FENOFIBRATE SCH MG: 145 TABLET ORAL at 08:16

## 2018-08-05 RX ADMIN — METOPROLOL TARTRATE SCH MG: 25 TABLET, FILM COATED ORAL at 20:11

## 2018-08-05 RX ADMIN — ACETAMINOPHEN PRN MG: 500 TABLET ORAL at 12:03

## 2018-08-05 RX ADMIN — GUAIFENESIN SCH MG: 600 TABLET, EXTENDED RELEASE ORAL at 20:12

## 2018-08-05 RX ADMIN — BENZONATATE PRN MG: 100 CAPSULE, LIQUID FILLED ORAL at 12:02

## 2018-08-05 RX ADMIN — GUAIFENESIN SCH MG: 600 TABLET, EXTENDED RELEASE ORAL at 08:16

## 2018-08-05 RX ADMIN — APIXABAN SCH MG: 5 TABLET, FILM COATED ORAL at 20:12

## 2018-08-05 RX ADMIN — FUROSEMIDE SCH MG: 20 TABLET ORAL at 08:16

## 2018-08-05 NOTE — HOSPPROG
Hospitalist Progress Note


Assessment/Plan: 





#Right Hilar Mass, concerning for primary bronchogenic carcinoma


-s/p Bronchoscopy


-f/u biopsy with Dr. Roland


-restart Eliquis


-Restart Aspirin today, will start baby aspirin





#Hemoptysis, resolved





#Chronic resp failure, baseline is 4 L at rest, 5 with exertion


-at baseline





#Hx of COPD, not in exacerbation





#Hx of CHF, on home meds





#Afib


-will decrease Metoprol dose given some hypotension recently





#HTN


-BP better today








#Pneumonia: resolved. s/p Levaquin








Inpatient


Awaiting placement likely [facility] tomorrow 


Subjective: no overnight events. no cp or sob.


Objective: 


 Vital Signs











Temp Pulse Resp BP Pulse Ox


 


 36.6 C   103 H  18   110/68   97 


 


 08/05/18 12:00  08/05/18 12:00  08/05/18 12:00  08/05/18 12:00  08/05/18 12:00








 Microbiology











 08/02/18 14:00 Gram Stain - Final





 Lung Right Upper Lobe - Bronchial Washings Bronchial Washings Culture - Final








 Laboratory Results





 08/05/18 04:44 





 08/04/18 11:55 





 











 08/04/18 08/05/18 08/06/18





 05:59 05:59 05:59


 


Intake Total 4000 1850 


 


Balance 4000 1850 








 











PT  18.5 SEC (12.0-15.0)  H  07/31/18  16:20    


 


INR  1.53  (0.83-1.16)  H  07/31/18  16:20    














- Physical Exam


Constitutional: no apparent distress


Eyes: PERRL


Ears, Nose, Mouth, Throat: moist mucous membranes, hearing normal


Cardiovascular: regular rate and rhythym, No edema


Respiratory: no respiratory distress, no rales or rhonchi, clear to auscultation


Gastrointestinal: normoactive bowel sounds, soft, non-tender abdomen


Skin: warm


Musculoskeletal: full muscle strength


Neurologic: AAOx3


Psychiatric: interacting appropriately, not anxious, not encephalopathic


Lymph, Heme, Immunologic: No petechiae





ICD10 Worksheet


Patient Problems: 


 Problems











Problem Status Onset


 


Chronic anticoagulation Acute  


 


Dependence on supplemental oxygen Acute  


 


Hemoptysis Acute  


 


History of atrial fibrillation Acute  


 


Atrial fibrillation Acute  


 


Pleural effusion Acute  


 


Pneumonia Acute  


 


Shortness of breath Acute  


 


chronic disease mgmt/transitional care Acute

## 2018-08-05 NOTE — ASMTCMCOM
CM Note

 

CM Note                       

Notes:

Per PT note, patient would benefit from home PT at her assisted living facility. I've referred her 

to [facility], and they have accepted. She should d/c back to [facility] tomorrow AM. Case Management 


will follow. 

 

Date Signed:  08/05/2018 02:42 PM

Electronically Signed By:Disha Blackburn RN

## 2018-08-06 VITALS — DIASTOLIC BLOOD PRESSURE: 63 MMHG | SYSTOLIC BLOOD PRESSURE: 129 MMHG

## 2018-08-06 LAB — PLATELET # BLD: 207 10^3/UL (ref 150–400)

## 2018-08-06 RX ADMIN — APIXABAN SCH MG: 5 TABLET, FILM COATED ORAL at 08:47

## 2018-08-06 RX ADMIN — GUAIFENESIN SCH MG: 600 TABLET, EXTENDED RELEASE ORAL at 08:47

## 2018-08-06 RX ADMIN — METOPROLOL TARTRATE SCH: 25 TABLET, FILM COATED ORAL at 08:45

## 2018-08-06 RX ADMIN — BENZONATATE PRN MG: 100 CAPSULE, LIQUID FILLED ORAL at 09:50

## 2018-08-06 RX ADMIN — FUROSEMIDE SCH MG: 20 TABLET ORAL at 08:47

## 2018-08-06 RX ADMIN — FLUTICASONE PROPIONATE AND SALMETEROL SCH PUFFS: 50; 500 POWDER RESPIRATORY (INHALATION) at 09:07

## 2018-08-06 RX ADMIN — FENOFIBRATE SCH MG: 145 TABLET ORAL at 08:47

## 2018-08-06 RX ADMIN — TIOTROPIUM BROMIDE SCH INH: 18 CAPSULE ORAL; RESPIRATORY (INHALATION) at 09:08

## 2018-08-06 NOTE — PDIAF
- Diagnosis


Diagnosis: right hilar mass


Code Status: Full Code





- Medication Management


Discharge Medications: 


 Medications to Continue on Transfer





Albuterol Hfa Anes Only [Proair Hfa Icu (*)] 1 - 2 puffs IH QID PRN 04/14/18 [

Last Taken Unknown]


Cetirizine [ZyrTEC 10 mg (*)] 10 mg PO DAILY PRN 04/14/18 [Last Taken 04/13/18]


Escitalopram Oxalate [Lexapro 10 MG] 10 mg PO DAILY 04/14/18 [Last Taken 07/31/ 18]


Famotidine [Pepcid 20 MG (*)] 20 mg PO BID PRN 04/14/18 [Last Taken 07/31/18 am 

dose only]


Fenofibrate [Tricor 145 mg (*)] 145 mg PO DAILY 04/14/18 [Last Taken 07/31/18]


Fluticasone/Salmeter 500/50Mcg [Advair 500/50 (*)] 1 puffs IH BID 04/14/18 [

Last Taken 07/31/18 am dose only]


Sucralfate [Carafate 1 GM (*)] 1 gm PO QID PRN 04/14/18 [Last Taken 07/31/18]


Tiotropium Inhaler [Spiriva Inhaler (RX)] 1 inh IH DAILY@12 04/14/18 [Last 

Taken 07/31/18]


amLODIPine BESYLATE [Norvasc 5 mg (*)] 5 mg PO DAILY 04/14/18 [Last Taken 07/31/ 18]


clonazePAM [Klonopin (*)] 0.25 - 0.5 mg PO HS PRN 04/14/18 [Last Taken 07/30/18]


Acetaminophen [Tylenol  mg (*)] 1,000 mg PO Q6 PRN 07/31/18 [Last Taken 

Unknown]


Albuterol Sulfate [Ventolin Hfa] 2 puffs IH Q6H PRN 07/31/18 [Last Taken Unknown

]


Apixaban [Eliquis] 5 mg PO BID 07/31/18 [Last Taken 07/31/18 am dose]


Atorvastatin Calcium [Lipitor 20 mg (*)] 20 mg PO HS 07/31/18 [Last Taken 07/30/ 18]


Benzonatate 200 mg PO TID 07/31/18 [Last Taken 07/31/18]


Furosemide [Lasix 20 MG (*)] 20 mg PO DAILY 07/31/18 [Last Taken 07/31/18]


Ipratropium/Albuterol [Duoneb (*)] 3 ml IH QID PRN 07/31/18 [Last Taken Unknown]


Ipratropium/Albuterol [Duoneb (*)] 3 ml IH QID PRN 07/31/18 [Last Taken Unknown]


Metoprolol Tartrate [Lopressor 50 mg (*)] 50 mg PO BID 07/31/18 [Last Taken 07/ 31/18 am dose only]


guaiFENesin [Mucinex 600 MG (*)] 600 mg PO BID 07/31/18 [Last Taken 07/31/18 am 

dose only]








Discharge Medications: Refer to the Discharge Home Medication list for PRN 

reason.





- Orders


Services needed: Registered Nurse, Physical Therapy


Isolation Type: None


Diet Recommendation: no restrictions on diet


Diet Texture: Regular Texture Diet


Additional Instructions: 


Activity as tolerated





F/U:


1) with Dr. Roland on August 13, please call for appointment. 





Biopsies results will be provided to you by Dr. Roland


All antibiotics have been stopped


Ok to restart Eliquis


Hold off on restarting Aspirin until you f/u with Dr. Roland





Please seek medical care or come back to the emergency room if experiencing 

difficulty breathing, severe chest pain, unrelieved pain, unable to keep down 

food or liquids or if coughing up large amounts of blood. 











- Follow Up Care


Current Providers and Referrals: 


Nida Jensen MD [Primary Care Provider] -

## 2018-08-06 NOTE — HOSPPROG
Hospitalist Progress Note


Assessment/Plan: 





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


   s/p Bronchoscopy


   f/u biopsy with Dr. Roland


   restart Elielisha


   Restart Aspirin today, will start baby aspirin





Hemoptysis, resolved





Chronic resp failure, baseline is 4 L at rest, 5 with exertion


   at baseline





Hx of COPD, not in exacerbation





Hx of CHF, on home meds





Afib


   will decrease Metoprol dose given some hypotension recently


   tachy this AM but less so nw





HTN


   BP better today








Pneumonia: resolved. s/p Levaquin





Subjective: very anxious for dc


Objective: 


 Vital Signs











Temp Pulse Resp BP Pulse Ox


 


 36.7 C   118 H  16   129/63 H  95 


 


 08/06/18 10:30  08/06/18 10:30  08/06/18 10:30  08/06/18 10:30  08/06/18 10:30








 Laboratory Results





 08/06/18 04:52 





 08/04/18 11:55 





 











 08/05/18 08/06/18 08/07/18





 05:59 05:59 05:59


 


Intake Total 1850 1380 


 


Balance 1850 1380 








 











PT  18.5 SEC (12.0-15.0)  H  07/31/18  16:20    


 


INR  1.53  (0.83-1.16)  H  07/31/18  16:20    














- Physical Exam


Constitutional: no apparent distress, appears nourished


Eyes: PERRL, anicteric sclera


Ears, Nose, Mouth, Throat: moist mucous membranes, hearing normal


Cardiovascular: regular rate and rhythym, no murmur, rub, or gallop


Respiratory: no respiratory distress, no rales or rhonchi


Gastrointestinal: normoactive bowel sounds


Genitourinary: no bladder fullness, No cintron in urethra


Skin: warm, normal color


Musculoskeletal: full muscle strength, no muscle tenderness


Neurologic: AAOx3





ICD10 Worksheet


Patient Problems: 


 Problems











Problem Status Onset


 


Chronic anticoagulation Acute  


 


Dependence on supplemental oxygen Acute  


 


Hemoptysis Acute  


 


History of atrial fibrillation Acute  


 


Atrial fibrillation Acute  


 


Pleural effusion Acute  


 


Pneumonia Acute  


 


Shortness of breath Acute  


 


chronic disease mgmt/transitional care Acute

## 2018-08-06 NOTE — ASMTDCNOTE
Case Management Discharge

 

Discharge Order Complete?     Answers:  Yes                                   

Patient to Obtain             Answers:  Other                         Notes:  Rubin [facility]

Medications                                                                   

Transportation Arranged       Answers:  Family/Friends                        

Faxed Final Orders            Answers:  Yes                                   

Discharge Comments            

Notes:

Patient discharged back to her apartment at [facility]. Resumption of home care orders sent to 


[facility]. Patient's daughter to transport her home. 

 

Date Signed:  08/06/2018 11:09 AM

Electronically Signed By:Disha Blackburn RN